# Patient Record
Sex: FEMALE | Race: WHITE | Employment: OTHER | URBAN - METROPOLITAN AREA
[De-identification: names, ages, dates, MRNs, and addresses within clinical notes are randomized per-mention and may not be internally consistent; named-entity substitution may affect disease eponyms.]

---

## 2020-01-17 PROBLEM — M16.12 PRIMARY OSTEOARTHRITIS OF LEFT HIP: Status: ACTIVE | Noted: 2020-01-17

## 2020-03-16 ENCOUNTER — LAB ENCOUNTER (OUTPATIENT)
Dept: LAB | Facility: HOSPITAL | Age: 75
End: 2020-03-16
Attending: ORTHOPAEDIC SURGERY
Payer: MEDICARE

## 2020-03-16 DIAGNOSIS — M16.12 PRIMARY OSTEOARTHRITIS OF LEFT HIP: ICD-10-CM

## 2020-03-16 LAB
ANTIBODY SCREEN: NEGATIVE
RH BLOOD TYPE: NEGATIVE

## 2020-03-16 PROCEDURE — 86850 RBC ANTIBODY SCREEN: CPT

## 2020-03-16 PROCEDURE — 87081 CULTURE SCREEN ONLY: CPT

## 2020-03-16 PROCEDURE — 86900 BLOOD TYPING SEROLOGIC ABO: CPT

## 2020-03-16 PROCEDURE — 86901 BLOOD TYPING SEROLOGIC RH(D): CPT

## 2020-08-03 ENCOUNTER — LAB ENCOUNTER (OUTPATIENT)
Dept: LAB | Facility: HOSPITAL | Age: 75
DRG: 470 | End: 2020-08-03
Attending: ORTHOPAEDIC SURGERY
Payer: MEDICARE

## 2020-08-03 DIAGNOSIS — E78.00 PURE HYPERCHOLESTEROLEMIA: ICD-10-CM

## 2020-08-03 DIAGNOSIS — M16.12 PRIMARY OSTEOARTHRITIS OF LEFT HIP: ICD-10-CM

## 2020-08-03 LAB — SARS-COV-2 RNA RESP QL NAA+PROBE: NOT DETECTED

## 2020-08-05 ENCOUNTER — ANESTHESIA EVENT (OUTPATIENT)
Dept: SURGERY | Facility: HOSPITAL | Age: 75
DRG: 470 | End: 2020-08-05
Payer: MEDICARE

## 2020-08-06 ENCOUNTER — HOSPITAL ENCOUNTER (INPATIENT)
Facility: HOSPITAL | Age: 75
LOS: 1 days | Discharge: HOME HEALTH | DRG: 470 | End: 2020-08-07
Attending: ORTHOPAEDIC SURGERY | Admitting: ORTHOPAEDIC SURGERY
Payer: MEDICARE

## 2020-08-06 ENCOUNTER — ANESTHESIA (OUTPATIENT)
Dept: SURGERY | Facility: HOSPITAL | Age: 75
DRG: 470 | End: 2020-08-06
Payer: MEDICARE

## 2020-08-06 ENCOUNTER — APPOINTMENT (OUTPATIENT)
Dept: GENERAL RADIOLOGY | Facility: HOSPITAL | Age: 75
DRG: 470 | End: 2020-08-06
Attending: PHYSICIAN ASSISTANT
Payer: MEDICARE

## 2020-08-06 ENCOUNTER — APPOINTMENT (OUTPATIENT)
Dept: GENERAL RADIOLOGY | Facility: HOSPITAL | Age: 75
DRG: 470 | End: 2020-08-06
Attending: ORTHOPAEDIC SURGERY
Payer: MEDICARE

## 2020-08-06 DIAGNOSIS — M16.12 PRIMARY OSTEOARTHRITIS OF LEFT HIP: Primary | ICD-10-CM

## 2020-08-06 DIAGNOSIS — E78.00 PURE HYPERCHOLESTEROLEMIA: ICD-10-CM

## 2020-08-06 LAB
ANTIBODY SCREEN: NEGATIVE
DEPRECATED RDW RBC AUTO: 51.3 FL (ref 35.1–46.3)
ERYTHROCYTE [DISTWIDTH] IN BLOOD BY AUTOMATED COUNT: 13.9 % (ref 11–15)
HCT VFR BLD AUTO: 44.3 % (ref 35–48)
HGB BLD-MCNC: 14.4 G/DL (ref 12–16)
MCH RBC QN AUTO: 32.1 PG (ref 26–34)
MCHC RBC AUTO-ENTMCNC: 32.5 G/DL (ref 31–37)
MCV RBC AUTO: 98.9 FL (ref 80–100)
PLATELET # BLD AUTO: 339 10(3)UL (ref 150–450)
RBC # BLD AUTO: 4.48 X10(6)UL (ref 3.8–5.3)
RH BLOOD TYPE: NEGATIVE
WBC # BLD AUTO: 15.2 X10(3) UL (ref 4–11)

## 2020-08-06 PROCEDURE — 86850 RBC ANTIBODY SCREEN: CPT

## 2020-08-06 PROCEDURE — 76942 ECHO GUIDE FOR BIOPSY: CPT | Performed by: ANESTHESIOLOGY

## 2020-08-06 PROCEDURE — 0SRB04Z REPLACEMENT OF LEFT HIP JOINT WITH CERAMIC ON POLYETHYLENE SYNTHETIC SUBSTITUTE, OPEN APPROACH: ICD-10-PCS | Performed by: ORTHOPAEDIC SURGERY

## 2020-08-06 PROCEDURE — 97116 GAIT TRAINING THERAPY: CPT

## 2020-08-06 PROCEDURE — 86900 BLOOD TYPING SEROLOGIC ABO: CPT

## 2020-08-06 PROCEDURE — 88311 DECALCIFY TISSUE: CPT | Performed by: ORTHOPAEDIC SURGERY

## 2020-08-06 PROCEDURE — 86901 BLOOD TYPING SEROLOGIC RH(D): CPT

## 2020-08-06 PROCEDURE — 85027 COMPLETE CBC AUTOMATED: CPT | Performed by: HOSPITALIST

## 2020-08-06 PROCEDURE — 3E0T3BZ INTRODUCTION OF ANESTHETIC AGENT INTO PERIPHERAL NERVES AND PLEXI, PERCUTANEOUS APPROACH: ICD-10-PCS | Performed by: ANESTHESIOLOGY

## 2020-08-06 PROCEDURE — 73501 X-RAY EXAM HIP UNI 1 VIEW: CPT | Performed by: PHYSICIAN ASSISTANT

## 2020-08-06 PROCEDURE — 97161 PT EVAL LOW COMPLEX 20 MIN: CPT

## 2020-08-06 PROCEDURE — 88304 TISSUE EXAM BY PATHOLOGIST: CPT | Performed by: ORTHOPAEDIC SURGERY

## 2020-08-06 DEVICE — PINNACLE 100 ACETABULAR SHELL GRIPTION 100 50MM OD
Type: IMPLANTABLE DEVICE | Site: HIP | Status: FUNCTIONAL
Brand: PINNACLE GRIPTION

## 2020-08-06 DEVICE — CORAIL HIP SYSTEM CEMENTLESS FEMORAL STEM 12/14 AMT 125 DEGREES KLA SIZE 11 HA COATED HIGH OFFSET COLLAR
Type: IMPLANTABLE DEVICE | Site: HIP | Status: FUNCTIONAL
Brand: CORAIL

## 2020-08-06 DEVICE — BIOLOX DELTA CERAMIC FEMORAL HEAD 32MM DIA +1 12/14 TAPER
Type: IMPLANTABLE DEVICE | Site: HIP | Status: FUNCTIONAL
Brand: BIOLOX DELTA

## 2020-08-06 DEVICE — PINNACLE HIP SOLUTIONS ALTRX POLYETHYLENE ACETABULAR LINER +4 NEUTRAL 32MM ID 50MM OD
Type: IMPLANTABLE DEVICE | Site: HIP | Status: FUNCTIONAL
Brand: PINNACLE ALTRX

## 2020-08-06 DEVICE — APEX HOLE ELIMINATOR - PS
Type: IMPLANTABLE DEVICE | Site: HIP | Status: FUNCTIONAL
Brand: APEX

## 2020-08-06 RX ORDER — ONDANSETRON 2 MG/ML
4 INJECTION INTRAMUSCULAR; INTRAVENOUS EVERY 4 HOURS PRN
Status: DISCONTINUED | OUTPATIENT
Start: 2020-08-06 | End: 2020-08-07

## 2020-08-06 RX ORDER — TIZANIDINE 2 MG/1
2 TABLET ORAL 3 TIMES DAILY PRN
Status: DISCONTINUED | OUTPATIENT
Start: 2020-08-06 | End: 2020-08-07

## 2020-08-06 RX ORDER — HYDROMORPHONE HYDROCHLORIDE 1 MG/ML
0.4 INJECTION, SOLUTION INTRAMUSCULAR; INTRAVENOUS; SUBCUTANEOUS EVERY 2 HOUR PRN
Status: DISCONTINUED | OUTPATIENT
Start: 2020-08-06 | End: 2020-08-07

## 2020-08-06 RX ORDER — SENNOSIDES 8.6 MG
17.2 TABLET ORAL NIGHTLY
Status: DISCONTINUED | OUTPATIENT
Start: 2020-08-06 | End: 2020-08-07

## 2020-08-06 RX ORDER — SODIUM CHLORIDE, SODIUM LACTATE, POTASSIUM CHLORIDE, CALCIUM CHLORIDE 600; 310; 30; 20 MG/100ML; MG/100ML; MG/100ML; MG/100ML
INJECTION, SOLUTION INTRAVENOUS CONTINUOUS
Status: DISCONTINUED | OUTPATIENT
Start: 2020-08-06 | End: 2020-08-07

## 2020-08-06 RX ORDER — SODIUM PHOSPHATE, DIBASIC AND SODIUM PHOSPHATE, MONOBASIC 7; 19 G/133ML; G/133ML
1 ENEMA RECTAL ONCE AS NEEDED
Status: DISCONTINUED | OUTPATIENT
Start: 2020-08-06 | End: 2020-08-07

## 2020-08-06 RX ORDER — ATORVASTATIN CALCIUM 10 MG/1
10 TABLET, FILM COATED ORAL NIGHTLY
Status: DISCONTINUED | OUTPATIENT
Start: 2020-08-06 | End: 2020-08-07

## 2020-08-06 RX ORDER — ACETAMINOPHEN 500 MG
1000 TABLET ORAL ONCE
Status: DISCONTINUED | OUTPATIENT
Start: 2020-08-06 | End: 2020-08-06 | Stop reason: HOSPADM

## 2020-08-06 RX ORDER — PHENYLEPHRINE HCL 10 MG/ML
VIAL (ML) INJECTION AS NEEDED
Status: DISCONTINUED | OUTPATIENT
Start: 2020-08-06 | End: 2020-08-06 | Stop reason: SURG

## 2020-08-06 RX ORDER — HYDROMORPHONE HYDROCHLORIDE 1 MG/ML
0.8 INJECTION, SOLUTION INTRAMUSCULAR; INTRAVENOUS; SUBCUTANEOUS EVERY 2 HOUR PRN
Status: DISCONTINUED | OUTPATIENT
Start: 2020-08-06 | End: 2020-08-07

## 2020-08-06 RX ORDER — SODIUM CHLORIDE 9 MG/ML
INJECTION, SOLUTION INTRAVENOUS CONTINUOUS
Status: DISCONTINUED | OUTPATIENT
Start: 2020-08-06 | End: 2020-08-07

## 2020-08-06 RX ORDER — ACETAMINOPHEN 325 MG/1
TABLET ORAL
Status: COMPLETED
Start: 2020-08-06 | End: 2020-08-06

## 2020-08-06 RX ORDER — ACETAMINOPHEN 500 MG
500 TABLET ORAL ONCE
Status: ON HOLD | COMMUNITY
End: 2020-08-07

## 2020-08-06 RX ORDER — ONDANSETRON 2 MG/ML
INJECTION INTRAMUSCULAR; INTRAVENOUS AS NEEDED
Status: DISCONTINUED | OUTPATIENT
Start: 2020-08-06 | End: 2020-08-06 | Stop reason: SURG

## 2020-08-06 RX ORDER — KETAMINE HYDROCHLORIDE 50 MG/ML
INJECTION, SOLUTION, CONCENTRATE INTRAMUSCULAR; INTRAVENOUS AS NEEDED
Status: DISCONTINUED | OUTPATIENT
Start: 2020-08-06 | End: 2020-08-06 | Stop reason: SURG

## 2020-08-06 RX ORDER — PROCHLORPERAZINE EDISYLATE 5 MG/ML
10 INJECTION INTRAMUSCULAR; INTRAVENOUS EVERY 6 HOURS PRN
Status: DISCONTINUED | OUTPATIENT
Start: 2020-08-06 | End: 2020-08-07

## 2020-08-06 RX ORDER — DIPHENHYDRAMINE HYDROCHLORIDE 50 MG/ML
25 INJECTION INTRAMUSCULAR; INTRAVENOUS ONCE AS NEEDED
Status: ACTIVE | OUTPATIENT
Start: 2020-08-06 | End: 2020-08-06

## 2020-08-06 RX ORDER — DEXAMETHASONE SODIUM PHOSPHATE 4 MG/ML
VIAL (ML) INJECTION AS NEEDED
Status: DISCONTINUED | OUTPATIENT
Start: 2020-08-06 | End: 2020-08-06 | Stop reason: SURG

## 2020-08-06 RX ORDER — CEFAZOLIN SODIUM/WATER 2 G/20 ML
2 SYRINGE (ML) INTRAVENOUS ONCE
Status: COMPLETED | OUTPATIENT
Start: 2020-08-06 | End: 2020-08-06

## 2020-08-06 RX ORDER — HYDROMORPHONE HYDROCHLORIDE 1 MG/ML
0.2 INJECTION, SOLUTION INTRAMUSCULAR; INTRAVENOUS; SUBCUTANEOUS EVERY 2 HOUR PRN
Status: DISCONTINUED | OUTPATIENT
Start: 2020-08-06 | End: 2020-08-07

## 2020-08-06 RX ORDER — ACETAMINOPHEN 325 MG/1
650 TABLET ORAL ONCE
Status: COMPLETED | OUTPATIENT
Start: 2020-08-06 | End: 2020-08-06

## 2020-08-06 RX ORDER — BISACODYL 10 MG
10 SUPPOSITORY, RECTAL RECTAL
Status: DISCONTINUED | OUTPATIENT
Start: 2020-08-06 | End: 2020-08-07

## 2020-08-06 RX ORDER — ACETAMINOPHEN 500 MG
1000 TABLET ORAL 4 TIMES DAILY
Status: DISCONTINUED | OUTPATIENT
Start: 2020-08-06 | End: 2020-08-07

## 2020-08-06 RX ORDER — METOCLOPRAMIDE HYDROCHLORIDE 5 MG/ML
10 INJECTION INTRAMUSCULAR; INTRAVENOUS EVERY 6 HOURS PRN
Status: DISCONTINUED | OUTPATIENT
Start: 2020-08-06 | End: 2020-08-07

## 2020-08-06 RX ORDER — OXYCODONE HYDROCHLORIDE 10 MG/1
10 TABLET ORAL EVERY 4 HOURS PRN
Status: DISCONTINUED | OUTPATIENT
Start: 2020-08-06 | End: 2020-08-07

## 2020-08-06 RX ORDER — OXYCODONE HYDROCHLORIDE 15 MG/1
15 TABLET ORAL EVERY 4 HOURS PRN
Status: DISCONTINUED | OUTPATIENT
Start: 2020-08-06 | End: 2020-08-07

## 2020-08-06 RX ORDER — DOCUSATE SODIUM 100 MG/1
100 CAPSULE, LIQUID FILLED ORAL 2 TIMES DAILY
Status: DISCONTINUED | OUTPATIENT
Start: 2020-08-06 | End: 2020-08-07

## 2020-08-06 RX ORDER — MAGNESIUM HYDROXIDE 1200 MG/15ML
LIQUID ORAL CONTINUOUS PRN
Status: COMPLETED | OUTPATIENT
Start: 2020-08-06 | End: 2020-08-06

## 2020-08-06 RX ORDER — DIPHENHYDRAMINE HYDROCHLORIDE 50 MG/ML
12.5 INJECTION INTRAMUSCULAR; INTRAVENOUS EVERY 4 HOURS PRN
Status: DISCONTINUED | OUTPATIENT
Start: 2020-08-06 | End: 2020-08-07

## 2020-08-06 RX ORDER — CEFAZOLIN SODIUM/WATER 2 G/20 ML
2 SYRINGE (ML) INTRAVENOUS EVERY 8 HOURS
Status: COMPLETED | OUTPATIENT
Start: 2020-08-06 | End: 2020-08-07

## 2020-08-06 RX ORDER — MELATONIN
325
Status: DISCONTINUED | OUTPATIENT
Start: 2020-08-07 | End: 2020-08-07

## 2020-08-06 RX ORDER — POLYETHYLENE GLYCOL 3350 17 G/17G
17 POWDER, FOR SOLUTION ORAL DAILY PRN
Status: DISCONTINUED | OUTPATIENT
Start: 2020-08-06 | End: 2020-08-07

## 2020-08-06 RX ORDER — ZOLPIDEM TARTRATE 5 MG/1
5 TABLET ORAL NIGHTLY PRN
Status: DISCONTINUED | OUTPATIENT
Start: 2020-08-06 | End: 2020-08-07

## 2020-08-06 RX ORDER — DEXAMETHASONE SODIUM PHOSPHATE 10 MG/ML
8 INJECTION, SOLUTION INTRAMUSCULAR; INTRAVENOUS ONCE
Status: COMPLETED | OUTPATIENT
Start: 2020-08-07 | End: 2020-08-07

## 2020-08-06 RX ORDER — DIPHENHYDRAMINE HCL 25 MG
25 CAPSULE ORAL EVERY 4 HOURS PRN
Status: DISCONTINUED | OUTPATIENT
Start: 2020-08-06 | End: 2020-08-07

## 2020-08-06 RX ORDER — OXYCODONE HYDROCHLORIDE 5 MG/1
5 TABLET ORAL EVERY 4 HOURS PRN
Status: DISCONTINUED | OUTPATIENT
Start: 2020-08-06 | End: 2020-08-07

## 2020-08-06 RX ORDER — BUPIVACAINE HYDROCHLORIDE 7.5 MG/ML
INJECTION, SOLUTION INTRASPINAL AS NEEDED
Status: DISCONTINUED | OUTPATIENT
Start: 2020-08-06 | End: 2020-08-06 | Stop reason: SURG

## 2020-08-06 RX ORDER — MIDAZOLAM HYDROCHLORIDE 1 MG/ML
INJECTION INTRAMUSCULAR; INTRAVENOUS AS NEEDED
Status: DISCONTINUED | OUTPATIENT
Start: 2020-08-06 | End: 2020-08-06 | Stop reason: SURG

## 2020-08-06 RX ADMIN — PHENYLEPHRINE HCL 100 MCG: 10 MG/ML VIAL (ML) INJECTION at 09:52:00

## 2020-08-06 RX ADMIN — BUPIVACAINE HYDROCHLORIDE 1.6 ML: 7.5 INJECTION, SOLUTION INTRASPINAL at 09:10:00

## 2020-08-06 RX ADMIN — SODIUM CHLORIDE, SODIUM LACTATE, POTASSIUM CHLORIDE, CALCIUM CHLORIDE: 600; 310; 30; 20 INJECTION, SOLUTION INTRAVENOUS at 10:45:00

## 2020-08-06 RX ADMIN — CEFAZOLIN SODIUM/WATER 2 G: 2 G/20 ML SYRINGE (ML) INTRAVENOUS at 09:05:00

## 2020-08-06 RX ADMIN — KETAMINE HYDROCHLORIDE 10 MG: 50 INJECTION, SOLUTION, CONCENTRATE INTRAMUSCULAR; INTRAVENOUS at 08:49:00

## 2020-08-06 RX ADMIN — MIDAZOLAM HYDROCHLORIDE 2 MG: 1 INJECTION INTRAMUSCULAR; INTRAVENOUS at 08:48:00

## 2020-08-06 RX ADMIN — PHENYLEPHRINE HCL 100 MCG: 10 MG/ML VIAL (ML) INJECTION at 09:42:00

## 2020-08-06 RX ADMIN — DEXAMETHASONE SODIUM PHOSPHATE 8 MG: 4 MG/ML VIAL (ML) INJECTION at 10:28:00

## 2020-08-06 RX ADMIN — MIDAZOLAM HYDROCHLORIDE 1 MG: 1 INJECTION INTRAMUSCULAR; INTRAVENOUS at 08:53:00

## 2020-08-06 RX ADMIN — MIDAZOLAM HYDROCHLORIDE 1 MG: 1 INJECTION INTRAMUSCULAR; INTRAVENOUS at 09:02:00

## 2020-08-06 RX ADMIN — SODIUM CHLORIDE, SODIUM LACTATE, POTASSIUM CHLORIDE, CALCIUM CHLORIDE: 600; 310; 30; 20 INJECTION, SOLUTION INTRAVENOUS at 10:21:00

## 2020-08-06 RX ADMIN — ONDANSETRON 4 MG: 2 INJECTION INTRAMUSCULAR; INTRAVENOUS at 10:28:00

## 2020-08-06 NOTE — ANESTHESIA PREPROCEDURE EVALUATION
PRE-OP EVALUATION    Patient Name: Bee Mcleod    Pre-op Diagnosis: Primary osteoarthritis of left hip [M16.12]    Procedure(s):  LEFT TOTAL HIP ARTHROPLASTY    Surgeon(s) and Role:     Rod German MD - Primary    Pre-op vitals reviewed.         Bod comment: few cigareets when out with friends    Alcohol use:  Yes      Alcohol/week: 2.5 - 4.2 standard drinks      Types: 3 - 5 Standard drinks or equivalent per week      Frequency: 2-4 times a month      Drinks per session: 1 or 2      Binge frequency: N

## 2020-08-06 NOTE — BRIEF OP NOTE
Pre-Operative Diagnosis: Primary osteoarthritis of left hip [M16.12]     Post-Operative Diagnosis: Primary osteoarthritis of left hip [M16.12]      Procedure Performed:   Procedure(s):  LEFT TOTAL HIP ARTHROPLASTY    Surgeon(s) and Role:     Maria Fernanda Precise

## 2020-08-06 NOTE — ANESTHESIA PROCEDURE NOTES
Regional Block  Performed by: Dawn Scott MD  Authorized by: Dawn Scott MD       General Information and Staff    Start Time:  8/6/2020 9:10 AM  End Time:  8/6/2020 9:15 AM  Anesthesiologist:  Dawn Scott MD  Performed by:   Anesthesiolog

## 2020-08-06 NOTE — H&P
Trinh Galeas   7/22/2020 9:15 AM   Office Visit   MRN:  [id number]   Description: 70year old female Provider: Sally German MD Department: [address] [place] [address]   Scanning Cover Sheet     Click to print Barcode Encounter Cover Sheet for scanning   O • Meloxicam 15 MG Oral Tab Take 1 tablet (15 mg total) by mouth daily. 30 tablet 1   • hydrochlorothiazide 12.5 MG Oral Tab Take 1 tablet (12.5 mg total) by mouth daily.  90 tablet 3   • atorvastatin 10 MG Oral Tab Take 1 tablet (10 mg total) by mouth daily /72   Pulse 79   Temp 97.1 °F (36.2 °C) (Temporal)   Ht 5' 6\" (1.676 m)   Wt 162 lb 6.4 oz (73.7 kg)   LMP  (LMP Unknown)   SpO2 98%   Breastfeeding No   BMI 26.21 kg/m²   GENERAL: well developed, well nourished,in no apparent distress  SKIN: no celia

## 2020-08-06 NOTE — CONSULTS
General Medicine Consult      Reason for consult: medical management    Consulted by: Dr. Soha Koenig    PCP: Keysha Bloom MD      History of Present Illness: Patient is a 70year old female with HL, OA is consulted for medical management s/p L NATI. sodium chloride       PRN Medication:sodium chloride 0.9%, zolpidem, PEG 3350, magnesium hydroxide, bisacodyl, Fleet Enema, ondansetron HCl, Metoclopramide HCl, Prochlorperazine Edisylate, diphenhydrAMINE HCl, diphenhydrAMINE **OR** diphenhydrAMINE HCl, ti sounds normal. Non distended, no overt hernias   Extremities: Extremities  no cyanosis or edema.    Skin: Skin color, texture, turgor normal. No visible rashes    Neurologic:  Psychiatric: No facial droop or dysarthria   appropriate affect,  memory intact

## 2020-08-06 NOTE — PLAN OF CARE
.RECD FROM [facility] PER BED ,ALERT ,AWAKE, ORIENTED. SEE ASSESSMENT NOTES. CALL LIGHT W/IN REACH INSTRUCTED TO CALL FOR ALL FOR ALL NEEDS AND ASSISTANCE. PT AND SPOUSE AWARE OF PLAN OF CARE DR CHING [facility]  NOT AVAIL WILL LET CALL [facility] FOR CONSULT.

## 2020-08-06 NOTE — PHYSICAL THERAPY NOTE
PHYSICAL THERAPY HIP EVALUATION - INPATIENT     Room Number: 368/368-A  Evaluation Date: 8/6/2020  Type of Evaluation: Initial  Physician Order: PT Eval and Treat    Presenting Problem: s/p left NATI 8/6/20  Reason for Therapy: Mobility Dysfunction and Disc WFL - within functional limits    RANGE OF MOTION AND STRENGTH ASSESSMENT  Upper extremity ROM and strength are within functional limits     Lower extremity ROM is within functional limits except left hip due to surgery    Lower extremity strength is withi with min a for left LE only, good sitting balance, no dizziness, sitting bp 128/71, able to perform left LAQ. Pt provided verbal instruction and demonstration of rw use, stood to rw with min a.   Pt able to perform left tke, marching in place with no left mobility; Endurance; Energy conservation;Patient education;Gait training;Range of motion;Strengthening;Stoop training;Transfer training  Rehab Potential : Good  Frequency (Obs): Daily  Number of Visits to Meet Established Goals: 3      CURRENT GOALS  Goal #1

## 2020-08-06 NOTE — INTERVAL H&P NOTE
Pre-op Diagnosis: Primary osteoarthritis of left hip [M16.12]    The above referenced H&P was reviewed by Janis Frost MD on 8/6/2020, the patient was examined and no significant changes have occurred in the patient's condition since the H&P was perfor

## 2020-08-06 NOTE — OPERATIVE REPORT
PATIENT'S NAME: Sophia Chang   ATTENDING PHYSICIAN: Shirley Wang MD   OPERATING PHYSICIAN: Shirley Wang MD   PATIENT ACCOUNT#:   [de-identified]    LOCATION:  [address] [place] [address],[address] RECORD #:   [id number]    YOB: 1945  ADMISSION D down in line with the vastus lateralis. The tendon was subperiosteally elevated off the anterior aspect of the greater trochanter down to  capsule, which was incised in a T-type fashion, partially excising the capsule to gain access to the joint.   After a Morfin taper, the final head was impacted in place with good fixation. The final reduction was performed and the hip was put through good range of motion with good leg length and offset and no instability. The pin was removed from the pelvis.   Irrisept ir

## 2020-08-06 NOTE — ANESTHESIA POSTPROCEDURE EVALUATION
[address] Patient Status:  Inpatient   Age/Gender 70year old female MRN [id number]   [facility] SURGERY Attending Drew Pruitt MD   Hosp Day # 0 PCP Thom Frankel, MD       Anesthesia Post-op Note    Procedure(

## 2020-08-06 NOTE — ANESTHESIA PROCEDURE NOTES
Spinal Block  Performed by: Parul Everett MD  Authorized by: Parul Everett MD       General Information and Staff    Start Time:  8/6/2020 8:52 AM  End Time:  8/6/2020 9:10 AM  Anesthesiologist:  Parul Everett MD  Performed by:   Anesthesiologis

## 2020-08-07 VITALS
BODY MASS INDEX: 25.72 KG/M2 | DIASTOLIC BLOOD PRESSURE: 76 MMHG | HEIGHT: 66 IN | SYSTOLIC BLOOD PRESSURE: 127 MMHG | WEIGHT: 160.06 LBS | RESPIRATION RATE: 20 BRPM | TEMPERATURE: 98 F | HEART RATE: 85 BPM | OXYGEN SATURATION: 94 %

## 2020-08-07 PROBLEM — Z47.89 ORTHOPEDIC AFTERCARE: Status: ACTIVE | Noted: 2020-08-07

## 2020-08-07 PROCEDURE — 97530 THERAPEUTIC ACTIVITIES: CPT

## 2020-08-07 PROCEDURE — 97110 THERAPEUTIC EXERCISES: CPT

## 2020-08-07 PROCEDURE — 97116 GAIT TRAINING THERAPY: CPT

## 2020-08-07 PROCEDURE — 97165 OT EVAL LOW COMPLEX 30 MIN: CPT

## 2020-08-07 PROCEDURE — 97535 SELF CARE MNGMENT TRAINING: CPT

## 2020-08-07 RX ORDER — ACETAMINOPHEN AND CODEINE PHOSPHATE 300; 30 MG/1; MG/1
1 TABLET ORAL EVERY 4 HOURS PRN
Status: DISCONTINUED | OUTPATIENT
Start: 2020-08-07 | End: 2020-08-07

## 2020-08-07 RX ORDER — TIZANIDINE 2 MG/1
1 TABLET ORAL 3 TIMES DAILY PRN
Status: DISCONTINUED | OUTPATIENT
Start: 2020-08-07 | End: 2020-08-07

## 2020-08-07 RX ORDER — ACETAMINOPHEN 325 MG/1
325 TABLET ORAL EVERY 4 HOURS PRN
Status: DISCONTINUED | OUTPATIENT
Start: 2020-08-07 | End: 2020-08-07

## 2020-08-07 RX ORDER — ACETAMINOPHEN 325 MG/1
650 TABLET ORAL EVERY 4 HOURS PRN
Status: DISCONTINUED | OUTPATIENT
Start: 2020-08-07 | End: 2020-08-07

## 2020-08-07 RX ORDER — ACETAMINOPHEN AND CODEINE PHOSPHATE 300; 30 MG/1; MG/1
2 TABLET ORAL EVERY 4 HOURS PRN
Status: DISCONTINUED | OUTPATIENT
Start: 2020-08-07 | End: 2020-08-07

## 2020-08-07 NOTE — CM/SW NOTE
71 yo sp total hip replacement. Post op protocol orders for discharge planning. Preop Joint journey plan from [address] for pt to discharge with [facility].     HOME SITUATION  Type of Home: House   Home Layout: One level(master on main)  S

## 2020-08-07 NOTE — PLAN OF CARE
Julia Coley for d/c per Dr. Alvin Sinha, Home w/ [facility] today.

## 2020-08-07 NOTE — CM/SW NOTE
08/07/20 1000   Discharge disposition   Expected discharge disposition Home-Health   Name of Facillity/Home Care/Hospice Residential   Home services after discharge Skilled home care   Discharge transportation Private car

## 2020-08-07 NOTE — OCCUPATIONAL THERAPY NOTE
OCCUPATIONAL THERAPY QUICK EVALUATION - INPATIENT    Room Number: 368/368-A  Evaluation Date: 8/7/2020     Type of Evaluation: Quick Eval  Presenting Problem: (L NATI)    Physician Order: IP Consult to Occupational Therapy  Reason for Therapy:  ADL/IADL Dys Tolerated    PAIN ASSESSMENT  Ratin  Location: (L hip)  Management Techniques: Body mechanics; Relaxation;Repositioning    COGNITION  WFL    RANGE OF MOTION AND STRENGTH ASSESSMENT  Upper extremity ROM is within functional limits     Upper extremity str completed with supervision on elevated toilet. Standing oral care, hair care completed in standing with mod I.     Instruction was given on bed mobility techniques, patient able to demonstrate good integration of hip precautions however she was limited by Assessment/Performance Deficits  MODERATE - Comorbidities and min to mod modifications of tasks    Clinical Decision Making  LOW - Analysis of occupational profile, problem-focused assessments, limited treatment options    Overall Complexity  LOW     OT Monika Cruz

## 2020-08-07 NOTE — PLAN OF CARE
WBC's=15.2, repeat CBC in am, Tylenol #3 ordered for pain, Oxycontin for severe pain , watched discharge videos, tubi- in place, scripts filled prior to admission, HH on d/c , spouse at bedside, updated on plan of care.

## 2020-08-07 NOTE — PROGRESS NOTES
Post Op Day 1 Ortho Note: Left NATI    Status Post Nerve Block:  Type of Nerve Block: Left PENG  Single Injection Nerve Block    Post op review: No evidence of immediate block related complications, No paresthesia noted, Able to lift leg(s), Able to plantar

## 2020-08-07 NOTE — PHYSICAL THERAPY NOTE
PHYSICAL THERAPY HIP TREATMENT NOTE - INPATIENT      Room Number: 368/368-A     Session: 1  Number of Visits to Meet Established Goals: 3    Presenting Problem: s/p left NATI 8/6/20    Problem List  Active Problems:    * No active hospital problems.  * Little   How much help from another person does the patient currently need. ..   -   Moving to and from a bed to a chair (including a wheelchair)?: A Little   -   Need to walk in hospital room?: A Little   -   Climbing 3-5 steps with a railing?: A Little chair;Needs met;Call light within reach;RN aware of session/findings; All patient questions and concerns addressed;SCDs in place; Ice applied; Family present    ASSESSMENT   Pt continues to present with impaired strength and decreased ROM LLE , decreased endu

## 2020-08-07 NOTE — PLAN OF CARE
Has adequate pain relief on prn oral med. Pt moving well, min assist when up. VSS, placed on o2 at 2l/nc, O2 sat drops to 87% when asleep. Aquacel dsg to lt hip cdi. Pt uses IS and ankle exercise as directed. Plan is d/c home with [facility].

## 2020-08-07 NOTE — PROGRESS NOTES
[facility] Hospitalist Progress Note     PCP: Carlos Floyd MD    CC:  Follow up    SUBJECTIVE:   Pt sitting up in chair, pain manageable.  No cp/sob/n/v/f/c. +U, no BM    OBJECTIVE:  Temp:  [97.6 °F (36.4 °C)-98.6 °F (37 °C)] 98.6 °F (37 °C)  Pulse:  [75-9 problems.  *    72 year old female with HL, OA is consulted for medical management s/p L NATI.     **Expected pain  -IV dilaudid prn, oxy prn-encourage transition to oral  -antiemetics, bowel regimen    **leukocytosis-reactive, monitor     **OA s/p L NATI  -m

## 2021-07-28 PROBLEM — Z96.642 H/O TOTAL HIP ARTHROPLASTY, LEFT: Status: ACTIVE | Noted: 2021-07-28

## 2022-05-24 RX ORDER — SCOLOPAMINE TRANSDERMAL SYSTEM 1 MG/1
1 PATCH, EXTENDED RELEASE TRANSDERMAL ONCE
Status: CANCELLED | OUTPATIENT
Start: 2022-05-24 | End: 2022-05-24

## 2022-05-24 RX ORDER — MELOXICAM 15 MG/1
15 TABLET ORAL DAILY
COMMUNITY
End: 2022-07-01

## 2022-05-25 ENCOUNTER — ORDER TRANSCRIPTION (OUTPATIENT)
Dept: PHYSICAL THERAPY | Facility: HOSPITAL | Age: 77
End: 2022-05-25

## 2022-05-25 DIAGNOSIS — Z96.641 HISTORY OF RIGHT HIP REPLACEMENT: Primary | ICD-10-CM

## 2022-06-27 ENCOUNTER — LAB ENCOUNTER (OUTPATIENT)
Dept: LAB | Age: 77
End: 2022-06-27
Attending: ORTHOPAEDIC SURGERY
Payer: MEDICARE

## 2022-06-27 ENCOUNTER — LABORATORY ENCOUNTER (OUTPATIENT)
Dept: LAB | Age: 77
End: 2022-06-27
Attending: ORTHOPAEDIC SURGERY
Payer: MEDICARE

## 2022-06-27 DIAGNOSIS — M16.11 OSTEOARTHRITIS OF RIGHT HIP: ICD-10-CM

## 2022-06-27 LAB
ANTIBODY SCREEN: NEGATIVE
BASOPHILS # BLD AUTO: 0.06 X10(3) UL (ref 0–0.2)
BASOPHILS NFR BLD AUTO: 0.6 %
EOSINOPHIL # BLD AUTO: 0.12 X10(3) UL (ref 0–0.7)
EOSINOPHIL NFR BLD AUTO: 1.2 %
ERYTHROCYTE [DISTWIDTH] IN BLOOD BY AUTOMATED COUNT: 13.6 %
HCT VFR BLD AUTO: 48.2 %
HGB BLD-MCNC: 15.3 G/DL
IMM GRANULOCYTES # BLD AUTO: 0.03 X10(3) UL (ref 0–1)
IMM GRANULOCYTES NFR BLD: 0.3 %
LYMPHOCYTES # BLD AUTO: 3.37 X10(3) UL (ref 1–4)
LYMPHOCYTES NFR BLD AUTO: 34.9 %
MCH RBC QN AUTO: 32.2 PG (ref 26–34)
MCHC RBC AUTO-ENTMCNC: 31.7 G/DL (ref 31–37)
MCV RBC AUTO: 101.5 FL
MONOCYTES # BLD AUTO: 0.69 X10(3) UL (ref 0.1–1)
MONOCYTES NFR BLD AUTO: 7.1 %
NEUTROPHILS # BLD AUTO: 5.39 X10 (3) UL (ref 1.5–7.7)
NEUTROPHILS # BLD AUTO: 5.39 X10(3) UL (ref 1.5–7.7)
NEUTROPHILS NFR BLD AUTO: 55.9 %
PLATELET # BLD AUTO: 372 10(3)UL (ref 150–450)
RBC # BLD AUTO: 4.75 X10(6)UL
RH BLOOD TYPE: NEGATIVE
SARS-COV-2 RNA RESP QL NAA+PROBE: NOT DETECTED
WBC # BLD AUTO: 9.7 X10(3) UL (ref 4–11)

## 2022-06-27 PROCEDURE — 86900 BLOOD TYPING SEROLOGIC ABO: CPT

## 2022-06-27 PROCEDURE — 86901 BLOOD TYPING SEROLOGIC RH(D): CPT

## 2022-06-27 PROCEDURE — 36415 COLL VENOUS BLD VENIPUNCTURE: CPT

## 2022-06-27 PROCEDURE — 85025 COMPLETE CBC W/AUTO DIFF WBC: CPT

## 2022-06-27 PROCEDURE — 86850 RBC ANTIBODY SCREEN: CPT

## 2022-06-30 ENCOUNTER — ANESTHESIA EVENT (OUTPATIENT)
Dept: SURGERY | Facility: HOSPITAL | Age: 77
End: 2022-06-30
Payer: MEDICARE

## 2022-06-30 ENCOUNTER — APPOINTMENT (OUTPATIENT)
Dept: GENERAL RADIOLOGY | Facility: HOSPITAL | Age: 77
End: 2022-06-30
Attending: PHYSICIAN ASSISTANT
Payer: MEDICARE

## 2022-06-30 ENCOUNTER — HOSPITAL ENCOUNTER (OUTPATIENT)
Facility: HOSPITAL | Age: 77
Discharge: HOME HEALTH | End: 2022-07-01
Attending: ORTHOPAEDIC SURGERY | Admitting: ORTHOPAEDIC SURGERY
Payer: MEDICARE

## 2022-06-30 ENCOUNTER — ANESTHESIA (OUTPATIENT)
Dept: SURGERY | Facility: HOSPITAL | Age: 77
End: 2022-06-30
Payer: MEDICARE

## 2022-06-30 DIAGNOSIS — M16.11 OSTEOARTHRITIS OF RIGHT HIP: Primary | ICD-10-CM

## 2022-06-30 LAB — CREAT BLD-MCNC: 0.67 MG/DL

## 2022-06-30 PROCEDURE — 88304 TISSUE EXAM BY PATHOLOGIST: CPT | Performed by: ORTHOPAEDIC SURGERY

## 2022-06-30 PROCEDURE — 0SR902A REPLACEMENT OF RIGHT HIP JOINT WITH METAL ON POLYETHYLENE SYNTHETIC SUBSTITUTE, UNCEMENTED, OPEN APPROACH: ICD-10-PCS | Performed by: ORTHOPAEDIC SURGERY

## 2022-06-30 PROCEDURE — 97116 GAIT TRAINING THERAPY: CPT

## 2022-06-30 PROCEDURE — 73501 X-RAY EXAM HIP UNI 1 VIEW: CPT | Performed by: PHYSICIAN ASSISTANT

## 2022-06-30 PROCEDURE — 97110 THERAPEUTIC EXERCISES: CPT

## 2022-06-30 PROCEDURE — 88311 DECALCIFY TISSUE: CPT | Performed by: ORTHOPAEDIC SURGERY

## 2022-06-30 PROCEDURE — 82565 ASSAY OF CREATININE: CPT | Performed by: PHYSICIAN ASSISTANT

## 2022-06-30 PROCEDURE — 97161 PT EVAL LOW COMPLEX 20 MIN: CPT

## 2022-06-30 DEVICE — PINNACLE HIP SOLUTIONS ALTRX POLYETHYLENE ACETABULAR LINER +4 NEUTRAL 32MM ID 50MM OD
Type: IMPLANTABLE DEVICE | Site: HIP | Status: FUNCTIONAL
Brand: PINNACLE ALTRX

## 2022-06-30 DEVICE — PINNACLE 100 ACETABULAR SHELL GRIPTION 100 50MM OD
Type: IMPLANTABLE DEVICE | Site: HIP | Status: FUNCTIONAL
Brand: PINNACLE GRIPTION

## 2022-06-30 DEVICE — CORAIL HIP SYSTEM CEMENTLESS FEMORAL STEM 12/14 AMT 135 DEGREES KA SIZE 11 HA COATED STANDARD COLLAR
Type: IMPLANTABLE DEVICE | Site: HIP | Status: FUNCTIONAL
Brand: CORAIL

## 2022-06-30 DEVICE — BIOLOX DELTA CERAMIC FEMORAL HEAD 32MM DIA +1 12/14 TAPER
Type: IMPLANTABLE DEVICE | Site: HIP | Status: FUNCTIONAL
Brand: BIOLOX DELTA

## 2022-06-30 DEVICE — APEX HOLE ELIMINATOR - PS
Type: IMPLANTABLE DEVICE | Site: HIP | Status: FUNCTIONAL
Brand: APEX

## 2022-06-30 RX ORDER — POLYETHYLENE GLYCOL 3350 17 G/17G
17 POWDER, FOR SOLUTION ORAL DAILY PRN
Status: DISCONTINUED | OUTPATIENT
Start: 2022-06-30 | End: 2022-07-01

## 2022-06-30 RX ORDER — CEFAZOLIN SODIUM/WATER 2 G/20 ML
2 SYRINGE (ML) INTRAVENOUS EVERY 8 HOURS
Status: DISCONTINUED | OUTPATIENT
Start: 2022-06-30 | End: 2022-06-30 | Stop reason: SDUPTHER

## 2022-06-30 RX ORDER — ACETAMINOPHEN 325 MG/1
650 TABLET ORAL ONCE
Status: COMPLETED | OUTPATIENT
Start: 2022-06-30 | End: 2022-06-30

## 2022-06-30 RX ORDER — BUPIVACAINE HYDROCHLORIDE 7.5 MG/ML
INJECTION, SOLUTION INTRASPINAL AS NEEDED
Status: DISCONTINUED | OUTPATIENT
Start: 2022-06-30 | End: 2022-06-30 | Stop reason: SURG

## 2022-06-30 RX ORDER — ASPIRIN 325 MG
325 TABLET ORAL 2 TIMES DAILY
COMMUNITY
Start: 2022-06-22

## 2022-06-30 RX ORDER — ASPIRIN 325 MG
325 TABLET ORAL 2 TIMES DAILY
Status: DISCONTINUED | OUTPATIENT
Start: 2022-06-30 | End: 2022-07-01

## 2022-06-30 RX ORDER — DIPHENHYDRAMINE HCL 25 MG
25 CAPSULE ORAL EVERY 4 HOURS PRN
Status: DISCONTINUED | OUTPATIENT
Start: 2022-06-30 | End: 2022-07-01

## 2022-06-30 RX ORDER — HYDROMORPHONE HYDROCHLORIDE 1 MG/ML
0.2 INJECTION, SOLUTION INTRAMUSCULAR; INTRAVENOUS; SUBCUTANEOUS EVERY 2 HOUR PRN
Status: DISCONTINUED | OUTPATIENT
Start: 2022-06-30 | End: 2022-07-01

## 2022-06-30 RX ORDER — KETAMINE HYDROCHLORIDE 50 MG/ML
INJECTION, SOLUTION, CONCENTRATE INTRAMUSCULAR; INTRAVENOUS AS NEEDED
Status: DISCONTINUED | OUTPATIENT
Start: 2022-06-30 | End: 2022-06-30 | Stop reason: SURG

## 2022-06-30 RX ORDER — HYDROMORPHONE HYDROCHLORIDE 1 MG/ML
0.4 INJECTION, SOLUTION INTRAMUSCULAR; INTRAVENOUS; SUBCUTANEOUS EVERY 5 MIN PRN
Status: DISCONTINUED | OUTPATIENT
Start: 2022-06-30 | End: 2022-06-30 | Stop reason: HOSPADM

## 2022-06-30 RX ORDER — DIPHENHYDRAMINE HYDROCHLORIDE 50 MG/ML
25 INJECTION INTRAMUSCULAR; INTRAVENOUS ONCE AS NEEDED
Status: ACTIVE | OUTPATIENT
Start: 2022-06-30 | End: 2022-06-30

## 2022-06-30 RX ORDER — ONDANSETRON 2 MG/ML
INJECTION INTRAMUSCULAR; INTRAVENOUS AS NEEDED
Status: DISCONTINUED | OUTPATIENT
Start: 2022-06-30 | End: 2022-06-30 | Stop reason: SURG

## 2022-06-30 RX ORDER — ONDANSETRON 2 MG/ML
4 INJECTION INTRAMUSCULAR; INTRAVENOUS EVERY 6 HOURS PRN
Status: DISCONTINUED | OUTPATIENT
Start: 2022-06-30 | End: 2022-06-30 | Stop reason: HOSPADM

## 2022-06-30 RX ORDER — MIDAZOLAM HYDROCHLORIDE 1 MG/ML
INJECTION INTRAMUSCULAR; INTRAVENOUS AS NEEDED
Status: DISCONTINUED | OUTPATIENT
Start: 2022-06-30 | End: 2022-06-30 | Stop reason: SURG

## 2022-06-30 RX ORDER — DIPHENHYDRAMINE HYDROCHLORIDE 50 MG/ML
12.5 INJECTION INTRAMUSCULAR; INTRAVENOUS EVERY 4 HOURS PRN
Status: DISCONTINUED | OUTPATIENT
Start: 2022-06-30 | End: 2022-07-01

## 2022-06-30 RX ORDER — CELECOXIB 200 MG/1
200 CAPSULE ORAL DAILY
COMMUNITY
Start: 2022-06-22 | End: 2022-07-22

## 2022-06-30 RX ORDER — MELATONIN
325
Status: DISCONTINUED | OUTPATIENT
Start: 2022-06-30 | End: 2022-07-01

## 2022-06-30 RX ORDER — HYDROMORPHONE HYDROCHLORIDE 1 MG/ML
0.2 INJECTION, SOLUTION INTRAMUSCULAR; INTRAVENOUS; SUBCUTANEOUS EVERY 5 MIN PRN
Status: DISCONTINUED | OUTPATIENT
Start: 2022-06-30 | End: 2022-06-30 | Stop reason: HOSPADM

## 2022-06-30 RX ORDER — DEXAMETHASONE SODIUM PHOSPHATE 10 MG/ML
8 INJECTION, SOLUTION INTRAMUSCULAR; INTRAVENOUS ONCE
Status: COMPLETED | OUTPATIENT
Start: 2022-07-01 | End: 2022-07-01

## 2022-06-30 RX ORDER — CEFAZOLIN SODIUM/WATER 2 G/20 ML
2 SYRINGE (ML) INTRAVENOUS EVERY 8 HOURS
Status: COMPLETED | OUTPATIENT
Start: 2022-06-30 | End: 2022-06-30

## 2022-06-30 RX ORDER — NALOXONE HYDROCHLORIDE 0.4 MG/ML
80 INJECTION, SOLUTION INTRAMUSCULAR; INTRAVENOUS; SUBCUTANEOUS AS NEEDED
Status: DISCONTINUED | OUTPATIENT
Start: 2022-06-30 | End: 2022-06-30 | Stop reason: HOSPADM

## 2022-06-30 RX ORDER — TRANEXAMIC ACID 10 MG/ML
1000 INJECTION, SOLUTION INTRAVENOUS ONCE
Status: COMPLETED | OUTPATIENT
Start: 2022-06-30 | End: 2022-06-30

## 2022-06-30 RX ORDER — CEFAZOLIN SODIUM/WATER 2 G/20 ML
2 SYRINGE (ML) INTRAVENOUS ONCE
Status: COMPLETED | OUTPATIENT
Start: 2022-06-30 | End: 2022-06-30

## 2022-06-30 RX ORDER — ATORVASTATIN CALCIUM 10 MG/1
10 TABLET, FILM COATED ORAL DAILY
Status: DISCONTINUED | OUTPATIENT
Start: 2022-06-30 | End: 2022-06-30

## 2022-06-30 RX ORDER — PROCHLORPERAZINE EDISYLATE 5 MG/ML
10 INJECTION INTRAMUSCULAR; INTRAVENOUS EVERY 6 HOURS PRN
Status: DISCONTINUED | OUTPATIENT
Start: 2022-06-30 | End: 2022-07-01

## 2022-06-30 RX ORDER — METOCLOPRAMIDE HYDROCHLORIDE 5 MG/ML
10 INJECTION INTRAMUSCULAR; INTRAVENOUS EVERY 8 HOURS PRN
Status: DISCONTINUED | OUTPATIENT
Start: 2022-06-30 | End: 2022-06-30 | Stop reason: HOSPADM

## 2022-06-30 RX ORDER — ATORVASTATIN CALCIUM 10 MG/1
10 TABLET, FILM COATED ORAL NIGHTLY
Status: DISCONTINUED | OUTPATIENT
Start: 2022-06-30 | End: 2022-07-01

## 2022-06-30 RX ORDER — SODIUM CHLORIDE 9 MG/ML
INJECTION, SOLUTION INTRAVENOUS CONTINUOUS
Status: DISCONTINUED | OUTPATIENT
Start: 2022-06-30 | End: 2022-07-01

## 2022-06-30 RX ORDER — TRAMADOL HYDROCHLORIDE 50 MG/1
50 TABLET ORAL EVERY 6 HOURS
Status: DISCONTINUED | OUTPATIENT
Start: 2022-06-30 | End: 2022-07-01

## 2022-06-30 RX ORDER — KETOROLAC TROMETHAMINE 30 MG/ML
INJECTION, SOLUTION INTRAMUSCULAR; INTRAVENOUS AS NEEDED
Status: DISCONTINUED | OUTPATIENT
Start: 2022-06-30 | End: 2022-06-30 | Stop reason: SURG

## 2022-06-30 RX ORDER — SODIUM CHLORIDE, SODIUM LACTATE, POTASSIUM CHLORIDE, CALCIUM CHLORIDE 600; 310; 30; 20 MG/100ML; MG/100ML; MG/100ML; MG/100ML
INJECTION, SOLUTION INTRAVENOUS CONTINUOUS
Status: DISCONTINUED | OUTPATIENT
Start: 2022-06-30 | End: 2022-06-30 | Stop reason: HOSPADM

## 2022-06-30 RX ORDER — DOCUSATE SODIUM 100 MG/1
100 CAPSULE, LIQUID FILLED ORAL 2 TIMES DAILY
Status: DISCONTINUED | OUTPATIENT
Start: 2022-06-30 | End: 2022-07-01

## 2022-06-30 RX ORDER — OXYCODONE HYDROCHLORIDE 5 MG/1
5 TABLET ORAL EVERY 4 HOURS PRN
Status: DISCONTINUED | OUTPATIENT
Start: 2022-06-30 | End: 2022-07-01

## 2022-06-30 RX ORDER — KETOROLAC TROMETHAMINE 15 MG/ML
15 INJECTION, SOLUTION INTRAMUSCULAR; INTRAVENOUS EVERY 6 HOURS
Status: COMPLETED | OUTPATIENT
Start: 2022-06-30 | End: 2022-07-01

## 2022-06-30 RX ORDER — HYDROMORPHONE HYDROCHLORIDE 1 MG/ML
0.6 INJECTION, SOLUTION INTRAMUSCULAR; INTRAVENOUS; SUBCUTANEOUS EVERY 5 MIN PRN
Status: DISCONTINUED | OUTPATIENT
Start: 2022-06-30 | End: 2022-06-30 | Stop reason: HOSPADM

## 2022-06-30 RX ORDER — OXYCODONE HYDROCHLORIDE 5 MG/1
2.5 TABLET ORAL EVERY 4 HOURS PRN
Status: DISCONTINUED | OUTPATIENT
Start: 2022-06-30 | End: 2022-07-01

## 2022-06-30 RX ORDER — SENNOSIDES 8.6 MG
17.2 TABLET ORAL NIGHTLY
Status: DISCONTINUED | OUTPATIENT
Start: 2022-06-30 | End: 2022-07-01

## 2022-06-30 RX ORDER — ZOLPIDEM TARTRATE 5 MG/1
5 TABLET ORAL NIGHTLY PRN
Status: DISCONTINUED | OUTPATIENT
Start: 2022-06-30 | End: 2022-07-01

## 2022-06-30 RX ORDER — ACETAMINOPHEN 500 MG
1000 TABLET ORAL ONCE
Status: DISCONTINUED | OUTPATIENT
Start: 2022-06-30 | End: 2022-06-30 | Stop reason: HOSPADM

## 2022-06-30 RX ORDER — DEXAMETHASONE SODIUM PHOSPHATE 4 MG/ML
VIAL (ML) INJECTION AS NEEDED
Status: DISCONTINUED | OUTPATIENT
Start: 2022-06-30 | End: 2022-06-30 | Stop reason: SURG

## 2022-06-30 RX ORDER — BISACODYL 10 MG
10 SUPPOSITORY, RECTAL RECTAL
Status: DISCONTINUED | OUTPATIENT
Start: 2022-06-30 | End: 2022-07-01

## 2022-06-30 RX ORDER — ACETAMINOPHEN 325 MG/1
TABLET ORAL
Status: COMPLETED
Start: 2022-06-30 | End: 2022-06-30

## 2022-06-30 RX ORDER — ONDANSETRON 2 MG/ML
4 INJECTION INTRAMUSCULAR; INTRAVENOUS EVERY 4 HOURS PRN
Status: DISCONTINUED | OUTPATIENT
Start: 2022-06-30 | End: 2022-07-01

## 2022-06-30 RX ORDER — SODIUM PHOSPHATE, DIBASIC AND SODIUM PHOSPHATE, MONOBASIC 7; 19 G/133ML; G/133ML
1 ENEMA RECTAL ONCE AS NEEDED
Status: DISCONTINUED | OUTPATIENT
Start: 2022-06-30 | End: 2022-07-01

## 2022-06-30 RX ORDER — PHENYLEPHRINE HCL 10 MG/ML
VIAL (ML) INJECTION AS NEEDED
Status: DISCONTINUED | OUTPATIENT
Start: 2022-06-30 | End: 2022-06-30 | Stop reason: SURG

## 2022-06-30 RX ORDER — ACETAMINOPHEN 325 MG/1
650 TABLET ORAL 4 TIMES DAILY
Status: DISCONTINUED | OUTPATIENT
Start: 2022-06-30 | End: 2022-07-01

## 2022-06-30 RX ORDER — SODIUM CHLORIDE, SODIUM LACTATE, POTASSIUM CHLORIDE, CALCIUM CHLORIDE 600; 310; 30; 20 MG/100ML; MG/100ML; MG/100ML; MG/100ML
INJECTION, SOLUTION INTRAVENOUS CONTINUOUS
Status: DISCONTINUED | OUTPATIENT
Start: 2022-06-30 | End: 2022-07-01

## 2022-06-30 RX ORDER — HYDROMORPHONE HYDROCHLORIDE 1 MG/ML
0.4 INJECTION, SOLUTION INTRAMUSCULAR; INTRAVENOUS; SUBCUTANEOUS EVERY 2 HOUR PRN
Status: DISCONTINUED | OUTPATIENT
Start: 2022-06-30 | End: 2022-07-01

## 2022-06-30 RX ADMIN — PHENYLEPHRINE HCL 100 MCG: 10 MG/ML VIAL (ML) INJECTION at 07:50:00

## 2022-06-30 RX ADMIN — PHENYLEPHRINE HCL 50 MCG: 10 MG/ML VIAL (ML) INJECTION at 08:16:00

## 2022-06-30 RX ADMIN — SODIUM CHLORIDE, SODIUM LACTATE, POTASSIUM CHLORIDE, CALCIUM CHLORIDE: 600; 310; 30; 20 INJECTION, SOLUTION INTRAVENOUS at 07:40:00

## 2022-06-30 RX ADMIN — KETOROLAC TROMETHAMINE 15 MG: 30 INJECTION, SOLUTION INTRAMUSCULAR; INTRAVENOUS at 08:28:00

## 2022-06-30 RX ADMIN — TRANEXAMIC ACID 1000 MG: 10 INJECTION, SOLUTION INTRAVENOUS at 07:30:00

## 2022-06-30 RX ADMIN — KETAMINE HYDROCHLORIDE 25 MG: 50 INJECTION, SOLUTION, CONCENTRATE INTRAMUSCULAR; INTRAVENOUS at 07:40:00

## 2022-06-30 RX ADMIN — PHENYLEPHRINE HCL 50 MCG: 10 MG/ML VIAL (ML) INJECTION at 07:56:00

## 2022-06-30 RX ADMIN — BUPIVACAINE HYDROCHLORIDE 1.6 ML: 7.5 INJECTION, SOLUTION INTRASPINAL at 07:20:00

## 2022-06-30 RX ADMIN — SODIUM CHLORIDE, SODIUM LACTATE, POTASSIUM CHLORIDE, CALCIUM CHLORIDE: 600; 310; 30; 20 INJECTION, SOLUTION INTRAVENOUS at 07:00:00

## 2022-06-30 RX ADMIN — CEFAZOLIN SODIUM/WATER 2 G: 2 G/20 ML SYRINGE (ML) INTRAVENOUS at 07:20:00

## 2022-06-30 RX ADMIN — DEXAMETHASONE SODIUM PHOSPHATE 8 MG: 4 MG/ML VIAL (ML) INJECTION at 07:43:00

## 2022-06-30 RX ADMIN — ONDANSETRON 4 MG: 2 INJECTION INTRAMUSCULAR; INTRAVENOUS at 08:29:00

## 2022-06-30 RX ADMIN — MIDAZOLAM HYDROCHLORIDE 2 MG: 1 INJECTION INTRAMUSCULAR; INTRAVENOUS at 07:05:00

## 2022-06-30 NOTE — OPERATIVE REPORT
PATIENT'S NAME: Moi Bauer   ATTENDING PHYSICIAN: Axel Zuleta MD   OPERATING PHYSICIAN: Axel Zuleta MD   PATIENT ACCOUNT#:   [de-identified]    LOCATION:  [address] [place] [address],[address] RECORD #:   [id number]    YOB: 1945  ADMISSION DATE:  6/30/2022           OPERATION DATE:  6/30/2022     OPERATIVE REPORT     PREOPERATIVE DIAGNOSIS:  Right hip primary osteoarthritis. POSTOPERATIVE DIAGNOSIS: Right hip primary osteoarthritis. PROCEDURE PERFORMED:  Right total hip arthroplasty with Corail size 11 standard offset stem, 32 mm ceramic head with +1 neck, size 50 Gription 100 series cup with +4 polyethylene liner. ASSISTANT:  LOBO Roman    Skilled assistance was needed for patient positioning, prepping and draping, instrument holding and passing, retracting and suturing. ANESTHESIA:  Spinal with regional block. INDICATIONS:  This is a 78 yrs old [place] female who presents with ongoing pain and difficulties with the hip consistent with the above diagnosis with failed conservative care. I reviewed the indications and benefits of the procedure. The patient understood and agreed to proceed. PROCEDURE:  The patient was taken to the operating room and placed in the supine position after administration of spinal anesthesia. A regional block was performed at the lower extremity. The patient was turned to the lateral decubitus position where She was positioned and padded appropriately. A time-out was performed confirming the hip was the appropriate operative site. The patient received IV antibiotics preoperatively. The hip was prepped and draped in the usual sterile fashion. A knife was used to make a longitudinal incision at the lateral aspect of the hip. Bovie was used for subcutaneous dissection and hemostasis. Dissection was carried down to the fascia, which was incised in line with the skin incision, revealing the greater trochanter and its attachments.   Bovie was used to incise the gluteus medius musculature in line with the muscle fibers carrying the incision across the anterior aspect of the greater trochanter and down in line with the vastus lateralis. The tendon was subperiosteally elevated off the anterior aspect of the greater trochanter down to  capsule, which was incised in a T-type fashion, partially excising the capsule to gain access to the joint. After adequate exposure, a pin was placed in the pelvis just cephalad to the acetabulum. The pin was used to measure leg length and offset before the hip was dislocated. The saw was used to resect the femoral head without difficulty. Irrisept irrigation was then placed in the wound and allowed to sit for 1 minute before evacuating the solution. Attention was turned to the acetabulum and retractors were placed anteriorly and posteriorly. Soft tissue dissection was carried out and osteophytes were removed until the acetabular rim was well visualized. The reamers were then used in sequential fashion up to size 49 mm with good bleeding bone throughout. The acetabular trial was impacted in place with good fixation. The trial was removed and the 50 mm cup was impacted in place with good fixation and good position. The center hole eliminator was placed in the cup. The +4 polyethylene liner was impacted with good fixation. Attention was turned to the femur. The cookie cutter was used to gain lateral access to the canal.  The canal finder was used followed by the broaches up to size 11, which found to be appropriate fit and this was left in place for trialing. The calcar reamer was used. A standard offset neck and +1 head was placed and the hip was reduced. It was put through good range of motion with good leg length and offset, and no instability. The hip was dislocated and all the trials were removed. The stem was placed down the canal and impacted in place with good fixation.   A trial reduction was again performed with good range of motion and no instability. The trial head was removed. After cleaning the [facility], the final head was impacted in place with good fixation. The final reduction was performed and the hip was put through good range of motion with good leg length and offset and no instability. The pin was removed from the pelvis. Irrisept irrigation was placed in the wound and allowed to sit for 1 minute before evacuating the solution. A #2 Orthocord suture was used in a figure-of-eight fashion to close the gluteus medius tendon, 1 being placed through drill holes. A running #1 Vicryl suture was used proximally and distally to the deep soft tissue. The fascia was closed in a similar fashion with a #2 Orthocord suture in a figure-of-eight fashion centrally and running fashion proximally and distally. Subcutaneous tissue was closed in layers, most superficially with an inverted 2-0 Vicryl suture and the skin was closed with staples. A sterile dressing was placed. The patient tolerated the procedure well. There were no complications. Blood loss was approximately 150 ml. The patient was taken to Recovery in stable condition.

## 2022-06-30 NOTE — BRIEF OP NOTE
Pre-Operative Diagnosis: OSTEOARTHRITIS RIGHT HIP     Post-Operative Diagnosis: OSTEOARTHRITIS RIGHT HIP      Procedure Performed:   RIGHT LATERAL TOTAL HIP ARTHROPLASTY    Surgeon(s) and Role:     * Juhi Concepcion MD - Primary    Assistant(s):  Surgical Assistant.: Katy Hatch  PA: Jessi Cardenas PA-C     Surgical Findings: OA     Specimen: bone     Estimated Blood Loss: Blood Output: 150 mL (6/30/2022  8:29 AM)      Dictation Number:  Hailey Jiménez MD  6/30/2022  8:40 AM

## 2022-06-30 NOTE — ANESTHESIA POSTPROCEDURE EVALUATION
[address] Patient Status:  Outpatient in a Bed   Age/Gender 78year old female MRN [id number]   [facility] SURGERY Attending Jesu Woody MD   Hosp Day # 0 [facility] Yandy Kramer MD       Anesthesia Post-op Note    RIGHT LATERAL TOTAL HIP ARTHROPLASTY    Procedure Summary     Date: 06/30/22 Room / Location: [address] [place] [address] OR 14 / 1404 [facility] OR    Anesthesia Start: 0700 Anesthesia Stop: 7918    Procedure: RIGHT LATERAL TOTAL HIP ARTHROPLASTY (Right Hip) Diagnosis: (OSTEOARTHRITIS RIGHT HIP)    Surgeons: Jesu Woody MD Anesthesiologist: Sariah Rangel MD    Anesthesia Type: spinal ASA Status: 2          Anesthesia Type: spinal    Vitals Value Taken Time   BP 92/53 06/30/22 0845   Temp 97.4 06/30/22 0845   Pulse 68 06/30/22 0845   Resp 16 06/30/22 0845   SpO2 96 06/30/22 0845       Patient Location: PACU    Anesthesia Type: spinal    Airway Patency: patent    Postop Pain Control: adequate    Mental Status: preanesthetic baseline    Nausea/Vomiting: none    Cardiopulmonary/Hydration status: stable euvolemic    Complications: no apparent anesthesia related complications    Postop vital signs: stable    Dental Exam: Unchanged from Preop    Patient to be discharged from PACU when criteria met.

## 2022-06-30 NOTE — INTERVAL H&P NOTE
Pre-op Diagnosis: OSTEOARTHRITIS RIGHT HIP    The above referenced H&P was reviewed by Isra Barraza MD on 6/30/2022, the patient was examined and no significant changes have occurred in the patient's condition since the H&P was performed. I discussed with the patient and/or legal representative the potential benefits, risks and side effects of this procedure; the likelihood of the patient achieving goals; and potential problems that might occur during recuperation. I discussed reasonable alternatives to the procedure, including risks, benefits and side effects related to the alternatives and risks related to not receiving this procedure. We will proceed with procedure as planned.

## 2022-06-30 NOTE — ANESTHESIA PROCEDURE NOTES
Spinal Block  Performed by: Leighann Ceja MD  Authorized by: Leighann Ceja MD       General Information and Staff    Start Time:  6/30/2022 7:10 AM  End Time:  6/30/2022 7:20 AM  Anesthesiologist:  Leighann Ceja MD  Performed by:   Anesthesiologist  Site identification: surface landmarks    Preanesthetic Checklist: patient identified, IV checked, risks and benefits discussed, monitors and equipment checked, pre-op evaluation, timeout performed, anesthesia consent and sterile technique used      Procedure Details    Patient Position:  Sitting  Prep: ChloraPrep    Monitoring:  Cardiac monitor, heart rate and continuous pulse ox  Approach:  Midline  Location:  L3-4  Injection Technique:  Single-shot    Needle    Needle Type:  Pencil-tip  Needle Gauge:  25 G  Needle Length:  3.5 in    Assessment    Sensory Level:  T8  Events: clear CSF, CSF aspirated, well tolerated and blood negative      Additional Comments     Bupiv 0.75% w/dextrose 1.6mL

## 2022-07-01 VITALS
RESPIRATION RATE: 16 BRPM | HEART RATE: 79 BPM | BODY MASS INDEX: 28.21 KG/M2 | HEIGHT: 66 IN | DIASTOLIC BLOOD PRESSURE: 57 MMHG | TEMPERATURE: 98 F | OXYGEN SATURATION: 95 % | SYSTOLIC BLOOD PRESSURE: 107 MMHG | WEIGHT: 175.5 LBS

## 2022-07-01 LAB
HCT VFR BLD AUTO: 40.4 %
HGB BLD-MCNC: 13 G/DL

## 2022-07-01 PROCEDURE — 85018 HEMOGLOBIN: CPT | Performed by: PHYSICIAN ASSISTANT

## 2022-07-01 PROCEDURE — 97165 OT EVAL LOW COMPLEX 30 MIN: CPT

## 2022-07-01 PROCEDURE — 97530 THERAPEUTIC ACTIVITIES: CPT

## 2022-07-01 PROCEDURE — 85014 HEMATOCRIT: CPT | Performed by: PHYSICIAN ASSISTANT

## 2022-07-01 PROCEDURE — 97535 SELF CARE MNGMENT TRAINING: CPT

## 2022-07-01 PROCEDURE — 97116 GAIT TRAINING THERAPY: CPT

## 2022-07-01 NOTE — PLAN OF CARE
Pt A&Ox4. VSS on room air, IS encouraged. SCDs to BLE. Incision to R hip with aquacel drsg C/D/I. Gel ice on for pain and swelling. Spandagrip to RLE. Pt tolerating general diet. Last BM 6/28/22. Miralax given this AM. Voiding without difficulty. Pain controlled with scheduled pain medications. Fall precautions in place and pt reminded to \"call; don't fall. \" Plan to discharge home with home health when cleared. POC discussed with pt.

## 2022-07-01 NOTE — PLAN OF CARE
Assumed care of pt @ 1200   Pt is A/Ox 4. On RA, VSS  IVF infusing, saline locked when tolerating oral intake  Tolerating diet. PT/OT recommending- home  Pt states pain is moderate but well controlled when not moving, states pain is severe with movement. PRN and scheduled pain medication given  Up as tolerated with 1-2 assist and walker  Intake/outputs WNL. Plan poss dc tomorrow afternoon    Updated POC with patient and family. Will continue to monitor.         Problem: SKIN/TISSUE INTEGRITY - ADULT  Goal: Skin integrity remains intact  Description: INTERVENTIONS  - Assess and document risk factors for pressure ulcer development  - Assess and document skin integrity  - Monitor for areas of redness and/or skin breakdown  - Initiate interventions, skin care algorithm/standards of care as needed  Outcome: Progressing  Goal: Incision(s), wounds(s) or drain site(s) healing without S/S of infection  Description: INTERVENTIONS:  - Assess and document risk factors for pressure ulcer development  - Assess and document skin integrity  - Assess and document dressing/incision, wound bed, drain sites and surrounding tissue  - Implement wound care per orders  - Initiate isolation precautions as appropriate  - Initiate Pressure Ulcer prevention bundle as indicated  Outcome: Progressing     Problem: MUSCULOSKELETAL - ADULT  Goal: Return mobility to safest level of function  Description: INTERVENTIONS:  - Assess patient stability and activity tolerance for standing, transferring and ambulating w/ or w/o assistive devices  - Assist with transfers and ambulation using safe patient handling equipment as needed  - Ensure adequate protection for wounds/incisions during mobilization  - Obtain PT/OT consults as needed  - Advance activity as appropriate  - Communicate ordered activity level and limitations with patient/family  Outcome: Progressing  Goal: Maintain proper alignment of affected body part  Description: INTERVENTIONS:  - Support and protect limb and body alignment per provider's orders  - Instruct and reinforce with patient and family use of appropriate assistive device and precautions (e.g. spinal or hip dislocation precautions)  Outcome: Progressing     Problem: Impaired Functional Mobility  Goal: Achieve highest/safest level of mobility/gait  Description: Interventions:  - Assess patient's functional ability and stability  - Promote increasing activity/tolerance for mobility and gait  - Educate and engage patient/family in tolerated activity level and precautions  - Recommend use of  RW for transfers and ambulation  Outcome: Progressing     Problem: Impaired Activities of Daily Living  Goal: Achieve highest/safest level of independence in self care  Description: Interventions:  - Assess ability and encourage patient to participate in ADLs to maximize function  - Promote sitting position while performing ADLs such as feeding, grooming, and bathing  - Educate and encourage patient/family in tolerated functional activity level and precautions during self-care  - Encourage patient to incorporate impaired side during daily activities to promote function  Outcome: Progressing     Problem: PAIN - ADULT  Goal: Verbalizes/displays adequate comfort level or patient's stated pain goal  Description: INTERVENTIONS:  - Encourage pt to monitor pain and request assistance  - Assess pain using appropriate pain scale  - Administer analgesics based on type and severity of pain and evaluate response  - Implement non-pharmacological measures as appropriate and evaluate response  - Consider cultural and social influences on pain and pain management  - Manage/alleviate anxiety  - Utilize distraction and/or relaxation techniques  - Monitor for opioid side effects  - Notify MD/LIP if interventions unsuccessful or patient reports new pain  - Anticipate increased pain with activity and pre-medicate as appropriate  Outcome: Progressing     Problem: RISK FOR INFECTION - ADULT  Goal: Absence of fever/infection during anticipated neutropenic period  Description: INTERVENTIONS  - Monitor WBC  - Administer growth factors as ordered  - Implement neutropenic guidelines  Outcome: Progressing     Problem: SAFETY ADULT - FALL  Goal: Free from fall injury  Description: INTERVENTIONS:  - Assess pt frequently for physical needs  - Identify cognitive and physical deficits and behaviors that affect risk of falls.   - Spring fall precautions as indicated by assessment.  - Educate pt/family on patient safety including physical limitations  - Instruct pt to call for assistance with activity based on assessment  - Modify environment to reduce risk of injury  - Provide assistive devices as appropriate  - Consider OT/PT consult to assist with strengthening/mobility  - Encourage toileting schedule  Outcome: Progressing     Problem: DISCHARGE PLANNING  Goal: Discharge to home or other facility with appropriate resources  Description: INTERVENTIONS:  - Identify barriers to discharge w/pt and caregiver  - Include patient/family/discharge partner in discharge planning  - Arrange for needed discharge resources and transportation as appropriate  - Identify discharge learning needs (meds, wound care, etc)  - Arrange for interpreters to assist at discharge as needed  - Consider post-discharge preferences of patient/family/discharge partner  - Complete POLST form as appropriate  - Assess patient's ability to be responsible for managing their own health  - Refer to Case Management Department for coordinating discharge planning if the patient needs post-hospital services based on physician/LIP order or complex needs related to functional status, cognitive ability or social support system  Outcome: Progressing

## 2022-07-01 NOTE — PROGRESS NOTES
Pt cleared by all MD's for discharge. Discharge education completed at bedside, all questions answered. Discharge education video played for pt. PIV removed, belongings packed. Pt discharging to home via wheelchair.

## 2022-07-01 NOTE — PLAN OF CARE
Assumed care at [address]. Alert and oriented x4. Pain controlled with scheduled medications. Rt hip dressing c/d/I. Fall precautions maintained per protocol. Plan for OT eval. Call light in reach at bedside Will continue to monitor.       Problem: SKIN/TISSUE INTEGRITY - ADULT  Goal: Skin integrity remains intact  Description: INTERVENTIONS  - Assess and document risk factors for pressure ulcer development  - Assess and document skin integrity  - Monitor for areas of redness and/or skin breakdown  - Initiate interventions, skin care algorithm/standards of care as needed  Outcome: Progressing  Goal: Incision(s), wounds(s) or drain site(s) healing without S/S of infection  Description: INTERVENTIONS:  - Assess and document risk factors for pressure ulcer development  - Assess and document skin integrity  - Assess and document dressing/incision, wound bed, drain sites and surrounding tissue  - Implement wound care per orders  - Initiate isolation precautions as appropriate  - Initiate Pressure Ulcer prevention bundle as indicated  Outcome: Progressing

## 2022-07-18 ENCOUNTER — OFFICE VISIT (OUTPATIENT)
Dept: PHYSICAL THERAPY | Age: 77
End: 2022-07-18
Attending: ORTHOPAEDIC SURGERY
Payer: MEDICARE

## 2022-07-18 DIAGNOSIS — Z96.641 HISTORY OF RIGHT HIP REPLACEMENT: ICD-10-CM

## 2022-07-18 PROCEDURE — 97161 PT EVAL LOW COMPLEX 20 MIN: CPT | Performed by: PHYSICAL THERAPIST

## 2022-07-20 ENCOUNTER — OFFICE VISIT (OUTPATIENT)
Dept: PHYSICAL THERAPY | Age: 77
End: 2022-07-20
Attending: ORTHOPAEDIC SURGERY
Payer: MEDICARE

## 2022-07-20 DIAGNOSIS — Z96.641 HISTORY OF RIGHT HIP REPLACEMENT: ICD-10-CM

## 2022-07-20 PROCEDURE — 97110 THERAPEUTIC EXERCISES: CPT | Performed by: PHYSICAL THERAPIST

## 2022-07-20 NOTE — PROGRESS NOTES
Dx: Right NATI, lateral approach           Authorized # of Visits:  8  Fall Risk: standard         Precautions: no active hip abd/extension until 6 weeks post op, DOS  2022      Subjective:   Patient states pain is at outside of right hip   Current Pain Ratin/10  Objective:   Treatment session included review of precautions that were confirmed with Dr. Jeremy Pyle office. Instructed patient in proper use of cane and spinal stabilization principles       Assessment:   Patient tolerated the session well reporting decreased pain after the session. She had no difficulty with progression to cane with SBA. Recommend she continue with rolling walker until next week secondary to balance and pain concerns     Plan:   Physical therapy 2x/wk with progression to ambulation with cane. Date: 2022  Tx#:  Date: Tx#: 3/ Date: Tx#: 4/ Date: Tx#: 5/ Date: Tx#: 6/ Date: Tx#: 7/ Date: Tx#: 8/   TherEx TherEx TherEx TherEx TherEx TherEx TherEx   Nustep L3  8 minutes          Right hip PROM within pain free ROM. TKE right and left knee > 15 reps with 5 second hold          Left knee to chest with right 3 reps x 20 seconds          Right heel slide with abdominal bracing via elbow dig          Left bent knee fallouts                                         Charges:  TherEx 3        Total Timed Treatment: 45 min  Total Treatment Time: 45 min

## 2022-07-25 ENCOUNTER — OFFICE VISIT (OUTPATIENT)
Dept: PHYSICAL THERAPY | Age: 77
End: 2022-07-25
Attending: ORTHOPAEDIC SURGERY
Payer: MEDICARE

## 2022-07-25 PROCEDURE — 97140 MANUAL THERAPY 1/> REGIONS: CPT | Performed by: PHYSICAL THERAPIST

## 2022-07-25 PROCEDURE — 97110 THERAPEUTIC EXERCISES: CPT | Performed by: PHYSICAL THERAPIST

## 2022-07-25 NOTE — PROGRESS NOTES
Dx: Right NATI, lateral approach           Authorized # of Visits:  8  Fall Risk: standard         Precautions: no active hip abd/extension until 6 weeks post op, DOS  6/30/2022      Subjective:     Current Pain Rating: 3-4/10  Objective:   Treatment session included ambulation with cane. Patient demonstrated preference for cane on right side. Right hip knee remain in slight flexion in supine. Pull decreased with left hip/knee in flexion       Assessment:   Patient's preference for cane on right indicates she will be utilizing the cane more for balance versus pain. She had no difficulty on 4 inch step with UE support. Recommend progression to 6 inch step      Plan:   Physical therapy 2x/wk with progression to ambulation with cane as able to tolerate     Date: 7/20/2022  Tx#: 2/8 Date: 7/25/2022  Tx#: 3/8 Date: Tx#: 4/ Date: Tx#: 5/ Date: Tx#: 6/ Date: Tx#: 7/ Date: Tx#: 8/   TherEx TherEx TherEx TherEx TherEx TherEx TherEx   Nustep L3  8 minutes  Nustep L5 8 minutes         Right hip PROM within pain free ROM. Forward step up 4 inch step 10 reps each UE support         TKE right and left knee > 15 reps with 5 second hold  Lateral step up and over 10 reps each UE support        Left knee to chest with right 3 reps x 20 seconds  TKE > 20 reps each with 5 second hold         Right heel slide with abdominal bracing via elbow dig  Left hip single knee to chest 4 reps x 30 seconds each        Left bent knee fallouts  Right heel slides > 20 reps          Sit to stand 10 reps no support         Right hip quad/hip flexor soft tissue release                      Charges:  TherEx 2, manual therapy        Total Timed Treatment: 50 min  Total Treatment Time: 50  min

## 2022-07-27 ENCOUNTER — OFFICE VISIT (OUTPATIENT)
Dept: PHYSICAL THERAPY | Age: 77
End: 2022-07-27
Attending: ORTHOPAEDIC SURGERY
Payer: MEDICARE

## 2022-07-27 PROCEDURE — 97110 THERAPEUTIC EXERCISES: CPT | Performed by: PHYSICAL THERAPIST

## 2022-07-27 NOTE — PROGRESS NOTES
Dx: Right NATI, lateral approach           Authorized # of Visits:  8  Fall Risk: standard         Precautions: no active hip abd/extension until 6 weeks post op, DOS  6/30/2022      Subjective:   Patient states she took some steps today without her walker   Current Pain Rating: 3-4/10  Objective:   Progressed to 6 inch step with closed chain exercises   Right knee extension improved following TKE and release of right hip flexor. She has tendency to draw right hip into flexion   Gait: right LE in toe out position       Assessment:   Patient has natural tendency towards toe out right LE. Right hip flexor tightness/tenderness persists. Right knee extension is improved since initial visit. As patient remains on precautions, unable to progress more aggressively with right hip ROM     Plan:   Physical therapy 2x/wk with progression to ambulation with cane as able to tolerate     Date: 7/20/2022  Tx#: 2/8 Date: 7/25/2022  Tx#: 3/8 Date: 7/27/2022  Tx#: 4/8 Date: Tx#: 5/ Date: Tx#: 6/ Date: Tx#: 7/ Date: Tx#: 8/   TherEx TherEx TherEx TherEx TherEx TherEx TherEx   Nustep L3  8 minutes  Nustep L5 8 minutes  Nustep L5 8 minutes        Right hip PROM within pain free ROM.   Forward step up 4 inch step 10 reps each UE support  Bilateral heel raise 20 reps       TKE right and left knee > 15 reps with 5 second hold  Lateral step up and over 10 reps each UE support Forward step up 6 inch step 10 reps each       Left knee to chest with right 3 reps x 20 seconds  TKE > 20 reps each with 5 second hold  Lateral step up/down 6 inch step 10 reps x 2 with UE support       Right heel slide with abdominal bracing via elbow dig  Left hip single knee to chest 4 reps x 30 seconds each Rhythmic stabilization contract shoulders 10 reps, 10 reps shoulder/pelvis        Left bent knee fallouts  Right heel slides > 20 reps  Forward step with bilateral overhead reach 10 reps each        Sit to stand 10 reps no support Forward step with bilateral same side reach 10 reps each         Right hip quad/hip flexor soft tissue release  TKE > 20 reps with 5 second hold each          Left LE single knee to chest PROM with right hip/knee in extension          Left bent knee fallouts                      Charges:  TherEx 3        Total Timed Treatment: 45 min  Total Treatment Time: 45  min

## 2022-08-01 ENCOUNTER — OFFICE VISIT (OUTPATIENT)
Dept: PHYSICAL THERAPY | Age: 77
End: 2022-08-01
Attending: ORTHOPAEDIC SURGERY
Payer: MEDICARE

## 2022-08-01 PROCEDURE — 97110 THERAPEUTIC EXERCISES: CPT

## 2022-08-01 NOTE — PROGRESS NOTES
Dx: Right NATI, lateral approach           Authorized # of Visits:  8  Fall Risk: standard         Precautions: no active hip abd/extension until 6 weeks post op, DOS  2022      Subjective:   Feels that she is making progress, but not as quickly as she want to be progressing. States that she has been using her cane more and more at home. Using RW outside of the house. Current Pain Ratin/10  Objective:   Treatment per log below. Assessment:   Overall, did well with all activities today with increased tolerance for exercise progression. Plan:   Physical therapy 2x/wk with progression to ambulation with cane as able to tolerate     Date: 2022  Tx#: 2 Date: 2022  Tx#: 3/8 Date: 2022  Tx#:  Date:2022  Tx#: 5/ Date: Tx#: 6/ Date: Tx#: 7/ Date: Tx#: 8/   TherEx TherEx TherEx TherEx TherEx TherEx TherEx   Nustep L3  8 minutes  Nustep L5 8 minutes  Nustep L5 8 minutes  Nustep L5 8 minutes       Right hip PROM within pain free ROM.   Forward step up 4 inch step 10 reps each UE support  Bilateral heel raise 20 reps Bilateral heel raise 20 reps      TKE right and left knee > 15 reps with 5 second hold  Lateral step up and over 10 reps each UE support Forward step up 6 inch step 10 reps each Forward step up 6 inch step 10 reps each      Left knee to chest with right 3 reps x 20 seconds  TKE > 20 reps each with 5 second hold  Lateral step up/down 6 inch step 10 reps x 2 with UE support Lateral step up/down 6 inch step 10 reps x 2 with UE support      Right heel slide with abdominal bracing via elbow dig  Left hip single knee to chest 4 reps x 30 seconds each Rhythmic stabilization contract shoulders 10 reps, 10 reps shoulder/pelvis  Rhythmic stabilization contract shoulders 10 reps, 10 reps shoulder/pelvis      Left bent knee fallouts  Right heel slides > 20 reps  Forward step with bilateral overhead reach 10 reps each        Sit to stand 10 reps no support Forward step with bilateral same side reach 10 reps each          Right hip quad/hip flexor soft tissue release  TKE > 20 reps with 5 second hold each   Shuttle L5 bilateral press x20; L3.5 SIngle Leg x 20        Left LE single knee to chest PROM with right hip/knee in extension  HL ABD red x 20        Left bent knee fallouts                      Charges:  TherEx 3        Total Timed Treatment: 45 min  Total Treatment Time: 45  min

## 2022-08-03 ENCOUNTER — OFFICE VISIT (OUTPATIENT)
Dept: PHYSICAL THERAPY | Age: 77
End: 2022-08-03
Attending: ORTHOPAEDIC SURGERY
Payer: MEDICARE

## 2022-08-03 PROCEDURE — 97110 THERAPEUTIC EXERCISES: CPT

## 2022-08-03 NOTE — PROGRESS NOTES
Dx: Right NATI, lateral approach           Authorized # of Visits:  8  Fall Risk: standard         Precautions: no active hip abd/extension until 6 weeks post op, DOS  2022      Subjective:   States that she continues to improve \"bit by bit. \"      Current Pain Ratin/10  Objective:   Treatment per log below. Assessment:   Overall, did well with all activities today with increased tolerance for exercise progression. Improved ability for gait without increased limitation. Plan:   Physical therapy 2x/wk with progression to ambulation with cane as able to tolerate     Date: 2022  Tx#: 2 Date: 2022  Tx#: 3/8 Date: 2022  Tx#:  Date:2022  Tx#:  Date:8/3/2022  Tx#: 6/ Date: Tx#: 7/ Date: Tx#: 8/   TherEx TherEx TherEx TherEx TherEx TherEx TherEx   Nustep L3  8 minutes  Nustep L5 8 minutes  Nustep L5 8 minutes  Nustep L5 8 minutes  Nustep L5 8 minutes     Right hip PROM within pain free ROM.   Forward step up 4 inch step 10 reps each UE support  Bilateral heel raise 20 reps Bilateral heel raise 20 reps Forward step up 6 inch step 10 reps each     TKE right and left knee > 15 reps with 5 second hold  Lateral step up and over 10 reps each UE support Forward step up 6 inch step 10 reps each Forward step up 6 inch step 10 reps each Lateral step up/down 6 inch step 10 reps x 2 with UE support     Left knee to chest with right 3 reps x 20 seconds  TKE > 20 reps each with 5 second hold  Lateral step up/down 6 inch step 10 reps x 2 with UE support Lateral step up/down 6 inch step 10 reps x 2 with UE support Rhythmic stabilization contract shoulders 10 reps, 10 reps shoulder/pelvis     Right heel slide with abdominal bracing via elbow dig  Left hip single knee to chest 4 reps x 30 seconds each Rhythmic stabilization contract shoulders 10 reps, 10 reps shoulder/pelvis  Rhythmic stabilization contract shoulders 10 reps, 10 reps shoulder/pelvis Shuttle L5 bilateral press x20; L3.5 SIngle Leg x 20     Left bent knee fallouts  Right heel slides > 20 reps  Forward step with bilateral overhead reach 10 reps each  HL ABD red x 20      Sit to stand 10 reps no support Forward step with bilateral same side reach 10 reps each    Standing hip flexor stretch 10 sec x 5      Right hip quad/hip flexor soft tissue release  TKE > 20 reps with 5 second hold each   Shuttle L5 bilateral press x20; L3.5 SIngle Leg x 20        Left LE single knee to chest PROM with right hip/knee in extension  HL ABD red x 20        Left bent knee fallouts                      Charges:  TherEx 3        Total Timed Treatment: 45 min  Total Treatment Time: 45  min

## 2022-08-08 ENCOUNTER — OFFICE VISIT (OUTPATIENT)
Dept: PHYSICAL THERAPY | Age: 77
End: 2022-08-08
Attending: ORTHOPAEDIC SURGERY
Payer: MEDICARE

## 2022-08-08 PROCEDURE — 97110 THERAPEUTIC EXERCISES: CPT

## 2022-08-08 NOTE — PROGRESS NOTES
Dx: Right NATI, lateral approach           Authorized # of Visits:  8  Fall Risk: standard         Precautions: no active hip abd/extension until 6 weeks post op, DOS  2022      Subjective:   States that she continues to experience incisional pain with stairs. Notes that she is walking around the house with her cane mostly, but will go without from time to time. Current Pain Ratin/10  Objective:   Treatment per log below. Ambulated in clinic without device with SBA+1      Assessment:   Overall, did well with all activities today with increased strength across the hip with more stability with stance. Plan:   Physical therapy 2x/wk with progression to ambulation with cane as able to tolerate     Date: 2022  Tx#:  Date: 2022  Tx#: 3/8 Date: 2022  Tx#:  Date:2022  Tx#: 5/ Date:8/3/2022  Tx#: 6/ Date: 2022  Tx#: 7/ Date: Tx#: 8/   TherEx TherEx TherEx TherEx TherEx TherEx TherEx   Nustep L3  8 minutes  Nustep L5 8 minutes  Nustep L5 8 minutes  Nustep L5 8 minutes  Nustep L5 8 minutes Nustep L5 8 minutes    Right hip PROM within pain free ROM.   Forward step up 4 inch step 10 reps each UE support  Bilateral heel raise 20 reps Bilateral heel raise 20 reps Forward step up 6 inch step 10 reps each Standing hip flexor stretch 10 sec x 5    TKE right and left knee > 15 reps with 5 second hold  Lateral step up and over 10 reps each UE support Forward step up 6 inch step 10 reps each Forward step up 6 inch step 10 reps each Lateral step up/down 6 inch step 10 reps x 2 with UE support Rhythmic stabilization contract shoulders 10 reps, 10 reps shoulder/pelvis    Left knee to chest with right 3 reps x 20 seconds  TKE > 20 reps each with 5 second hold  Lateral step up/down 6 inch step 10 reps x 2 with UE support Lateral step up/down 6 inch step 10 reps x 2 with UE support Rhythmic stabilization contract shoulders 10 reps, 10 reps shoulder/pelvis Forward step up 6 inch step 10 reps each Right heel slide with abdominal bracing via elbow dig  Left hip single knee to chest 4 reps x 30 seconds each Rhythmic stabilization contract shoulders 10 reps, 10 reps shoulder/pelvis  Rhythmic stabilization contract shoulders 10 reps, 10 reps shoulder/pelvis Shuttle L5 bilateral press x20; L3.5 SIngle Leg x 20 Lateral step up/down 6 inch step 10 reps x 2 with UE support    Left bent knee fallouts  Right heel slides > 20 reps  Forward step with bilateral overhead reach 10 reps each  HL ABD red x 20 Shuttle L5 bilateral press x20; L3.5 SIngle Leg x 20     Sit to stand 10 reps no support Forward step with bilateral same side reach 10 reps each    Standing hip flexor stretch 10 sec x 5      Right hip quad/hip flexor soft tissue release  TKE > 20 reps with 5 second hold each   Shuttle L5 bilateral press x20; L3.5 SIngle Leg x 20        Left LE single knee to chest PROM with right hip/knee in extension  HL ABD red x 20        Left bent knee fallouts                      Charges:  TherEx 3        Total Timed Treatment: 45 min  Total Treatment Time: 45  min

## 2022-08-10 ENCOUNTER — OFFICE VISIT (OUTPATIENT)
Dept: PHYSICAL THERAPY | Age: 77
End: 2022-08-10
Attending: ORTHOPAEDIC SURGERY
Payer: MEDICARE

## 2022-08-10 PROCEDURE — 97110 THERAPEUTIC EXERCISES: CPT | Performed by: PHYSICAL THERAPIST

## 2022-08-10 NOTE — PROGRESS NOTES
Dx: Right NATI, lateral approach           Authorized # of Visits:  8  Fall Risk: standard         Precautions: no active hip abd/extension until 6 weeks post op, DOS  2022     Progress Summary  Pt has attended 8 visits in Physical Therapy. Subjective:   Patient states she is using her cane mostly but she is using her walker at night when she gets up to go the bathroom. Current Pain Ratin/10  Objective:   Gait: Patient has progressed to ambulation with cane. Gait is steady with and without cane. She is slight forward flexion during gait and static standing. Right knee lacks terminal extension. With verbal cues for upright standing, there is right anterior hip pulling    Stairs: She is able ascend/descend 6 inch step. There is a natural right toe out position. Decreased hip abductor strength is present with ascend/descending. ROM/flexibility: Bilateral hip flexor and hamstring tightness. Quads have not been assessed. Strength: hooklying hip abduction/hip flexion 5/5. PF 3+/5 bilat. Bridging initiated today with no pain. Palpation: Mild lateral hip pain at the incision. Lateral hip pain with ascending a step is decreased with cues to avoid pelvic shift     Assessment:   Patient apprehension with gait/ambulation is improving. As she moves from hip precautions, she will benefit from additional work on flexibility, gait, stairs/curbs. The anterior hip tightness may be combination of limited right hip extension as well as hip flexor tightness. She also lacks 5-10 degrees of right knee extension. With over pressure into right knee extension, patient experiences more anterior hip stretch versus right knee pain/stretch. Discussed progress with the patient, recommend continuing therapy for the remaining 4 visits that are scheduled. Plan: Continue skilled Physical Therapy 2 x/week or a total of 4 visits over a 90 day period.  Treatment will include: ROM,progressive closed chain strengthening Patient/Family/Caregiver was advised of these findings, precautions, and treatment options and has agreed to actively participate in planning and for this course of care. Thank you for your referral. If you have any questions, please contact me at Dept: [phone]. Sincerely,  Electronically signed by therapist: Adriane Upton PT     Physician's certification required:  Yes  Please co-sign or sign and return this letter via fax as soon as possible to [phone]. I certify the need for these services furnished under this plan of treatment and while under my care. X___________________________________________________ Date____________________    Certification From: 7/91/6981  To:11/8/2022     Date: 7/20/2022  Tx#: 2/8 Date: 7/25/2022  Tx#: 3/8 Date: 7/27/2022  Tx#: 4/8 Date:8/1/2022  Tx#: 5/ Date:8/3/2022  Tx#: 6/ Date: 8/8/2022  Tx#: 7/ Date: 8/10/2022  Tx#: 8/8   TherEx TherEx TherEx TherEx TherEx TherEx TherEx   Nustep L3  8 minutes  Nustep L5 8 minutes  Nustep L5 8 minutes  Nustep L5 8 minutes  Nustep L5 8 minutes Nustep L5 8 minutes Nustep L5 8 minutes    Right hip PROM within pain free ROM.   Forward step up 4 inch step 10 reps each UE support  Bilateral heel raise 20 reps Bilateral heel raise 20 reps Forward step up 6 inch step 10 reps each Standing hip flexor stretch 10 sec x 5 Lateral step up/up down 4 inch step with UE support 10 reps each    TKE right and left knee > 15 reps with 5 second hold  Lateral step up and over 10 reps each UE support Forward step up 6 inch step 10 reps each Forward step up 6 inch step 10 reps each Lateral step up/down 6 inch step 10 reps x 2 with UE support Rhythmic stabilization contract shoulders 10 reps, 10 reps shoulder/pelvis Ascend 6 inch step with UE support > 10 reps each tactile cues for right LE   Left knee to chest with right 3 reps x 20 seconds  TKE > 20 reps each with 5 second hold  Lateral step up/down 6 inch step 10 reps x 2 with UE support Lateral step up/down 6 inch step 10 reps x 2 with UE support Rhythmic stabilization contract shoulders 10 reps, 10 reps shoulder/pelvis Forward step up 6 inch step 10 reps each Rhythmic stabilization/perturbations standing split and parallel stance > 10 each    Right heel slide with abdominal bracing via elbow dig  Left hip single knee to chest 4 reps x 30 seconds each Rhythmic stabilization contract shoulders 10 reps, 10 reps shoulder/pelvis  Rhythmic stabilization contract shoulders 10 reps, 10 reps shoulder/pelvis Shuttle L5 bilateral press x20; L3.5 SIngle Leg x 20 Lateral step up/down 6 inch step 10 reps x 2 with UE support Lateral step and return with UE support (HEP)   Left bent knee fallouts  Right heel slides > 20 reps  Forward step with bilateral overhead reach 10 reps each  HL ABD red x 20 Shuttle L5 bilateral press x20; L3.5 SIngle Leg x 20 Single knee to chest with left LE, right LE extended  (HEP)    Sit to stand 10 reps no support Forward step with bilateral same side reach 10 reps each    Standing hip flexor stretch 10 sec x 5  Single leg stance with UE support no pelvic drop (HEP)    Right hip quad/hip flexor soft tissue release  TKE > 20 reps with 5 second hold each   Shuttle L5 bilateral press x20; L3.5 SIngle Leg x 20   Bridge > 15 reps - good     Left LE single knee to chest PROM with right hip/knee in extension  HL ABD red x 20        Left bent knee fallouts                      Charges:  TherEx 3        Total Timed Treatment: 45 min  Total Treatment Time: 45  min

## 2022-08-15 ENCOUNTER — OFFICE VISIT (OUTPATIENT)
Dept: PHYSICAL THERAPY | Age: 77
End: 2022-08-15
Attending: ORTHOPAEDIC SURGERY
Payer: MEDICARE

## 2022-08-15 PROCEDURE — 97110 THERAPEUTIC EXERCISES: CPT | Performed by: PHYSICAL THERAPIST

## 2022-08-15 NOTE — PROGRESS NOTES
Dx: Right NATI, lateral approach           Authorized # of Visits:  8  Fall Risk: standard         Precautions: no active hip abd/extension until 6 weeks post op, DOS  6/30/2022      Subjective:     Current Pain Rating: 3/10 with walking   Objective:   Patient instructed in soft tissue release right hip flexor. Right knee extension is improved after release     Assessment:   Patient tolerated exercise well. She remains somewhat apprehensive with touch at the anterior hip but was able to appreciate reduction in pain with prolonged pressure. Right knee extension improved and there is reduction in anterior hip tightness       Plan: Continue physical therapy 3 more visits         Date: 7/20/2022  Tx#: 2/8 Date: 7/25/2022  Tx#: 3/8 Date: 7/27/2022  Tx#: 4/8 Date:8/1/2022  Tx#: 5/ Date:8/3/2022  Tx#: 6/ Date: 8/8/2022  Tx#: 7/ Date: 8/10/2022  Tx#: 8/8 Date: 8/15/2022  Tx#: 9/12   TherEx TherEx TherEx TherEx TherEx TherEx TherEx TherEx   Nustep L3  8 minutes  Nustep L5 8 minutes  Nustep L5 8 minutes  Nustep L5 8 minutes  Nustep L5 8 minutes Nustep L5 8 minutes Nustep L5 8 minutes  Nustep L5 8 minutes    Right hip PROM within pain free ROM.   Forward step up 4 inch step 10 reps each UE support  Bilateral heel raise 20 reps Bilateral heel raise 20 reps Forward step up 6 inch step 10 reps each Standing hip flexor stretch 10 sec x 5 Lateral step up/up down 4 inch step with UE support 10 reps each  Lateral step up/down 6 inch step 10 reps x 2    TKE right and left knee > 15 reps with 5 second hold  Lateral step up and over 10 reps each UE support Forward step up 6 inch step 10 reps each Forward step up 6 inch step 10 reps each Lateral step up/down 6 inch step 10 reps x 2 with UE support Rhythmic stabilization contract shoulders 10 reps, 10 reps shoulder/pelvis Ascend 6 inch step with UE support > 10 reps each tactile cues for right LE Resisted side,back,forward walk with medicordz in parallel bars > 8 reps each    Left knee to chest with right 3 reps x 20 seconds  TKE > 20 reps each with 5 second hold  Lateral step up/down 6 inch step 10 reps x 2 with UE support Lateral step up/down 6 inch step 10 reps x 2 with UE support Rhythmic stabilization contract shoulders 10 reps, 10 reps shoulder/pelvis Forward step up 6 inch step 10 reps each Rhythmic stabilization/perturbations standing split and parallel stance > 10 each  TRX squats 10 reps x 2    Right heel slide with abdominal bracing via elbow dig  Left hip single knee to chest 4 reps x 30 seconds each Rhythmic stabilization contract shoulders 10 reps, 10 reps shoulder/pelvis  Rhythmic stabilization contract shoulders 10 reps, 10 reps shoulder/pelvis Shuttle L5 bilateral press x20; L3.5 SIngle Leg x 20 Lateral step up/down 6 inch step 10 reps x 2 with UE support Lateral step and return with UE support (HEP) Calf stretch 30 seconds x 3 each    Left bent knee fallouts  Right heel slides > 20 reps  Forward step with bilateral overhead reach 10 reps each  HL ABD red x 20 Shuttle L5 bilateral press x20; L3.5 SIngle Leg x 20 Single knee to chest with left LE, right LE extended  (HEP) Single leg stance with UE support hip abduction 10 reps x 2 each    Sit to stand 10 reps no support Forward step with bilateral same side reach 10 reps each    Standing hip flexor stretch 10 sec x 5  Single leg stance with UE support no pelvic drop (HEP) Seated knee extension with ankle pump 10 reps x 3 each    Right hip quad/hip flexor soft tissue release  TKE > 20 reps with 5 second hold each   Shuttle L5 bilateral press x20; L3.5 SIngle Leg x 20   Bridge > 15 reps - good TKE  2 pounds 10 reps x 2 with 5 second hold      Left LE single knee to chest PROM with right hip/knee in extension  HL ABD red x 20    Instruction in right hip flexor release with prolonged pressure - good relief of tenderness, increased right knee extension      Left bent knee fallouts                        Charges:  TherEx 3        Total Timed Treatment: 45 min  Total Treatment Time: 45  min

## 2022-08-17 ENCOUNTER — OFFICE VISIT (OUTPATIENT)
Dept: PHYSICAL THERAPY | Age: 77
End: 2022-08-17
Attending: ORTHOPAEDIC SURGERY
Payer: MEDICARE

## 2022-08-17 PROCEDURE — 97110 THERAPEUTIC EXERCISES: CPT | Performed by: PHYSICAL THERAPIST

## 2022-08-17 NOTE — PROGRESS NOTES
Dx: Right NATI, lateral approach           Authorized # of Visits:  8  Fall Risk: standard         Precautions: no active hip abd/extension until 6 weeks post op, DOS  2022      Subjective:   Patient states she is mostly going without cane when walking in her home  Current Pain Ratin/10 with walking   Objective:   Closed chain exercise, decreased control with stance right LE, step matrix. SBA to CGA provided during closed chain exercise    Assessment:   No pain during the session. Patient is progressing with closed chain exercises. She is apprehensive regarding her balance    Plan:   Physical therapy 2 more sessions.      Date:2022  Tx#: / Date:8/3/2022  Tx#: / Date: 2022  Tx#: / Date: 8/10/2022  Tx#:  Date: 8/15/2022  Tx#:  Date: 2022  Tx#: 10/12   TherEx TherEx TherEx TherEx TherEx TherEx   Nustep L5 8 minutes  Nustep L5 8 minutes Nustep L5 8 minutes Nustep L5 8 minutes  Nustep L5 8 minutes  Nusep L5 8 minutes   Bilateral heel raise 20 reps Forward step up 6 inch step 10 reps each Standing hip flexor stretch 10 sec x 5 Lateral step up/up down 4 inch step with UE support 10 reps each  Lateral step up/down 6 inch step 10 reps x 2  Forward step 10 reps each 6 inch step, descend step with UE support   Forward step up 6 inch step 10 reps each Lateral step up/down 6 inch step 10 reps x 2 with UE support Rhythmic stabilization contract shoulders 10 reps, 10 reps shoulder/pelvis Ascend 6 inch step with UE support > 10 reps each tactile cues for right LE Resisted side,back,forward walk with medicordz in parallel bars > 8 reps each  Single leg stance with UE support 10 reps x 2 hip abduction    Lateral step up/down 6 inch step 10 reps x 2 with UE support Rhythmic stabilization contract shoulders 10 reps, 10 reps shoulder/pelvis Forward step up 6 inch step 10 reps each Rhythmic stabilization/perturbations standing split and parallel stance > 10 each  TRX squats 10 reps x 2  Medicordz side and forward walk in parallel bars   Rhythmic stabilization contract shoulders 10 reps, 10 reps shoulder/pelvis Shuttle L5 bilateral press x20; L3.5 SIngle Leg x 20 Lateral step up/down 6 inch step 10 reps x 2 with UE support Lateral step and return with UE support (HEP) Calf stretch 30 seconds x 3 each  Split stance transverse plane isometric 10 reps each     HL ABD red x 20 Shuttle L5 bilateral press x20; L3.5 SIngle Leg x 20 Single knee to chest with left LE, right LE extended  (HEP) Single leg stance with UE support hip abduction 10 reps x 2 each TRX squats 10 reps x 2      Standing hip flexor stretch 10 sec x 5  Single leg stance with UE support no pelvic drop (HEP) Seated knee extension with ankle pump 10 reps x 3 each Forward step  With SB lift overhead 10 reps x 2 CGA    Shuttle L5 bilateral press x20; L3.5 SIngle Leg x 20   Bridge > 15 reps - good TKE  2 pounds 10 reps x 2 with 5 second hold  Lateral step with SB lift overhead 10 reps each    HL ABD red x 20    Instruction in right hip flexor release with prolonged pressure - good relief of tenderness, increased right knee extension                         Charges:  TherEx 3        Total Timed Treatment: 45 min  Total Treatment Time: 45  min

## 2022-08-22 ENCOUNTER — OFFICE VISIT (OUTPATIENT)
Dept: PHYSICAL THERAPY | Age: 77
End: 2022-08-22
Attending: ORTHOPAEDIC SURGERY
Payer: MEDICARE

## 2022-08-22 PROCEDURE — 97110 THERAPEUTIC EXERCISES: CPT | Performed by: PHYSICAL THERAPIST

## 2022-08-22 NOTE — PROGRESS NOTES
Dx: Right NATI, lateral approach           Authorized # of Visits:  8  Fall Risk: standard         Precautions: no active hip abd/extension until 6 weeks post op, DOS  2022      Subjective:   Patient states she is no longer using her cane at home. She continues to use rolling walker at night when getting up from bed. Current Pain Ratin/10 with walking   Objective:   Decreased pelvic control with single leg stance right LE/UE support    Assessment:   Patient had no difficulty loading right LE on shuttle and with NWB exercise. Good strength with isometrics.  Postural changes appear to be spinal related     Plan:   Physical therapy one more session     Date:2022  Tx#: / Date:8/3/2022  Tx#: / Date: 2022  Tx#: / Date: 8/10/2022  Tx#:  Date: 8/15/2022  Tx#:  Date: 2022  Tx#: 10/12 Date: 2022  Tx#:    TherEx TherEx TherEx TherEx TherEx TherEx TherEx   Nustep L5 8 minutes  Nustep L5 8 minutes Nustep L5 8 minutes Nustep L5 8 minutes  Nustep L5 8 minutes  Nusep L5 8 minutes Nustep L5 8 minutes    Bilateral heel raise 20 reps Forward step up 6 inch step 10 reps each Standing hip flexor stretch 10 sec x 5 Lateral step up/up down 4 inch step with UE support 10 reps each  Lateral step up/down 6 inch step 10 reps x 2  Forward step 10 reps each 6 inch step, descend step with UE support TRX squats 10 reps x 2   Forward step up 6 inch step 10 reps each Lateral step up/down 6 inch step 10 reps x 2 with UE support Rhythmic stabilization contract shoulders 10 reps, 10 reps shoulder/pelvis Ascend 6 inch step with UE support > 10 reps each tactile cues for right LE Resisted side,back,forward walk with medicordz in parallel bars > 8 reps each  Single leg stance with UE support 10 reps x 2 hip abduction  Single leg balance light one arm UE support > 5 reps x 10 seconds    Lateral step up/down 6 inch step 10 reps x 2 with UE support Rhythmic stabilization contract shoulders 10 reps, 10 reps shoulder/pelvis Forward step up 6 inch step 10 reps each Rhythmic stabilization/perturbations standing split and parallel stance > 10 each  TRX squats 10 reps x 2  Medicordz side and forward walk in parallel bars Hooklying isometric hip abduction 10 reps x 2    Rhythmic stabilization contract shoulders 10 reps, 10 reps shoulder/pelvis Shuttle L5 bilateral press x20; L3.5 SIngle Leg x 20 Lateral step up/down 6 inch step 10 reps x 2 with UE support Lateral step and return with UE support (HEP) Calf stretch 30 seconds x 3 each  Split stance transverse plane isometric 10 reps each  Bent knee fallouts 10 reps x 2     HL ABD red x 20 Shuttle L5 bilateral press x20; L3.5 SIngle Leg x 20 Single knee to chest with left LE, right LE extended  (HEP) Single leg stance with UE support hip abduction 10 reps x 2 each TRX squats 10 reps x 2  Isometrics hookling transverse plane > 15 reps     Standing hip flexor stretch 10 sec x 5  Single leg stance with UE support no pelvic drop (HEP) Seated knee extension with ankle pump 10 reps x 3 each Forward step  With SB lift overhead 10 reps x 2 CGA Shuttle 4 bands squats 10 reps x 2     Shuttle 3 bands single leg squats right = 50  Left = 50    Shuttle L5 bilateral press x20; L3.5 SIngle Leg x 20   Bridge > 15 reps - good TKE  2 pounds 10 reps x 2 with 5 second hold  Lateral step with SB lift overhead 10 reps each  Lateral step up/down with UE support 6 inch step left = 20  Right = 20   HL ABD red x 20    Instruction in right hip flexor release with prolonged pressure - good relief of tenderness, increased right knee extension   Rhythmic stabilization parallel and split stance contact points pelvis/shoulders > 10 reps each                          Charges:  TherEx 3        Total Timed Treatment: 45 min  Total Treatment Time: 45  min

## 2022-08-24 ENCOUNTER — OFFICE VISIT (OUTPATIENT)
Dept: PHYSICAL THERAPY | Age: 77
End: 2022-08-24
Attending: ORTHOPAEDIC SURGERY
Payer: MEDICARE

## 2022-08-24 PROCEDURE — 97110 THERAPEUTIC EXERCISES: CPT | Performed by: PHYSICAL THERAPIST

## (undated) DEVICE — SUT ORTHOCORD 2 OS-6 223103

## (undated) DEVICE — SPONGE RAYTEC 4X4 RF DETECT

## (undated) DEVICE — STERILE POLYISOPRENE POWDER-FREE SURGICAL GLOVES: Brand: PROTEXIS

## (undated) DEVICE — SUTURE VICRYL 2-0 CT-1

## (undated) DEVICE — VIOLET BRAIDED (POLYGLACTIN 910), SYNTHETIC ABSORBABLE SUTURE: Brand: COATED VICRYL

## (undated) DEVICE — PILLOW ABDUCTION HIP SM

## (undated) DEVICE — SLEEVE KENDALL SCD EXPRESS MED

## (undated) DEVICE — Device: Brand: STABLECUT®

## (undated) DEVICE — STERILE SYNTHETIC POLYISOPRENE POWDER-FREE SURGICAL GLOVES WITH HYDROGEL COATING, SMOOTH FINISH, STRAIGHT FINGER: Brand: PROTEXIS

## (undated) DEVICE — SOL  .9 1000ML BAG

## (undated) DEVICE — CERAMIC HEAD UPCHARGE: Type: IMPLANTABLE DEVICE

## (undated) DEVICE — LIGHT HANDLE

## (undated) DEVICE — TOTAL HIP W/POR CUP & COCR HD: Type: IMPLANTABLE DEVICE

## (undated) DEVICE — TOTAL HIP CDS: Brand: MEDLINE INDUSTRIES, INC.

## (undated) DEVICE — SUTURE NABSB OTHCRD 2 OS-6

## (undated) DEVICE — HOOD: Brand: FLYTE

## (undated) DEVICE — PIN STEINMAN SMOOTH 1/8 9

## (undated) DEVICE — 450 ML BOTTLE OF 0.05% CHLORHEXIDINE GLUCONATE IN 99.95% STERILE WATER FOR IRRIGATION, USP AND APPLICATOR.: Brand: IRRISEPT ANTIMICROBIAL WOUND LAVAGE

## (undated) DEVICE — BANDAGE COMP PREMPRO 5YDX4IN

## (undated) DEVICE — CAUTERY TIP BLADE EXTENSION

## (undated) DEVICE — Device

## (undated) DEVICE — CHLORAPREP ORANGE TINT 10.5ML

## (undated) DEVICE — GOWN,SIRUS,FABRIC-REINFORCED,X-LARGE: Brand: MEDLINE

## (undated) DEVICE — KENDALL SCD EXPRESS SLEEVES, KNEE LENGTH, MEDIUM: Brand: KENDALL SCD

## (undated) DEVICE — WRAP COOLING HIP W/ICE PILLOW

## (undated) DEVICE — HOOD, PEEL-AWAY: Brand: FLYTE

## (undated) DEVICE — PIN STEINMAN SMOOTH 1/8 9: Type: IMPLANTABLE DEVICE | Site: HIP

## (undated) DEVICE — SYRINGE 30ML LL TIP

## (undated) DEVICE — NEEDLE SPINAL 18X3-1/2 PINK.

## (undated) DEVICE — 3M™ MICROFOAM™ TAPE 1528-4: Brand: 3M™ MICROFOAM™

## (undated) DEVICE — MLPD DISPOSABLE PAD (6' ROLL) 3 ROLLS: Brand: SCHAERER MEDICAL USA

## (undated) DEVICE — DRAPE,U/SHT,SPLIT,FILM,60X84,STERILE: Brand: MEDLINE

## (undated) DEVICE — DECANTER BAG 9": Brand: MEDLINE INDUSTRIES, INC.

## (undated) DEVICE — SPECIMEN CONTAINER,POSITIVE SEAL INDICATOR, OR PACKAGED: Brand: PRECISION

## (undated) DEVICE — SUT VICRYL 2-0 CT-1 J945H

## (undated) DEVICE — BLADE ELECTRODE: Brand: EDGE

## (undated) DEVICE — APPLICATOR CHLORAPREP 10.5ML

## (undated) NOTE — IP AVS SNAPSHOT
Patient Demographics     Address  [id number] Phone  [phone] [facility]  [phone] [facility] E-mail Address  [email or web address]      Emergency Contact(s)     Name Relation Home Work [address] [place] [address]   [phone] Sex  ? Usually allowed after four to six weeks – check with surgeon at your office visit. Return to work  ? Usually allowed after four to six weeks. Discuss specific work activities with your surgeon. Restrictions  ?  For hip replacement surgery blood in your urine. Use electric razors and soft toothbrushes only. ? Do not take aspirin while taking blood thinners unless ordered by your physician. ? Review anticoagulant education information sheet provided. Discomfort  ?  Surgical discomfort is ? An enema or suppository may be needed if above measures do not work. Prevention of infection and promotion of healing  ? Good hand washing is important.  Everyone should wash their hands or use hand  as soon as they walk in your house[facility] ? Increased or foul smelling drainage from incision  ? Red streaks on skin near incision. ? Temperature >100.4F.  ? Increased pain at incision not relieved by pain medication. Signs of Possible Dislocation  ? Increased severe leg or groin pain  ?  Flavio Clay space to open car doors to position yourself properly with walker to get in and out of your car safely; some parking spaces are  practically on top of each other and do not give you enough room.        SPECIAL  INSTRUCTIONS:      View hip discharge educatio Commonly known as:  LIPITOR      Take 1 tablet (10 mg total) by mouth daily. Ellen Salinas MD         celecoxib 200 MG Caps  Commonly known as:  CeleBREX      Take 1 capsule (200 mg total) by mouth daily.   Stop taking on:  August 30, 2020   Fatou HEREDIA mL 08/07/20 0609 Given              Recent Vital Signs       Most Recent Value   Vitals  127/76 Filed at 08/07/2020 1146   Pulse  85 Filed at 08/07/2020 1146   Resp  20 Filed at 08/07/2020 1146   Temp  98 °F (36.7 °C) Filed at 08/07/2020 1146   SpO2  94 % Filed:  8/6/2020  6:47 AM Date of Service:  8/6/2020  6:47 AM Status:  Signed    :  Ricardo James MD (Physician)       Judah Baltazar   7/22/2020 9:15 AM   Office Visit   MRN:  [id number]   Description: 70year old female Provider: Saúl Paniagua Son Dr. Michael Sanchez at [facility] on 8/6/2020. Pt without symptoms            Current Outpatient Medications   Medication Sig Dispense Refill   • Meloxicam 15 MG Oral Tab Take 1 tablet (15 mg total) by mouth daily.  30 tablet 1   • hydrochlorothiazide 12.5 MG Oral Tab /72   Pulse 79   Temp 97.1 °F (36.2 °C) (Temporal)   Ht 5' 6\" (1.676 m)   Wt 162 lb 6.4 oz (73.7 kg)   LMP  (LMP Unknown)   SpO2 98%   Breastfeeding No   BMI 26.21 kg/m²   GENERAL: well developed, well nourished,in no apparent distress  SKIN: no celia Electronically signed by Caryle Ferrier, MD on 8/6/2020  6:47 AM   Attribution Key    JL. 1 - Caryle Ferrier, MD on 8/6/2020  6:47 AM                        Consults - MD Consult Notes      Consults signed by Kiran Wilcox MD at 8/6/2020  3:20 atorvastatin 10 MG Oral Tab, Take 1 tablet (10 mg total) by mouth daily. (Patient taking differently: Take 10 mg by mouth nightly.  ), Disp: 90 tablet, Rfl: 3  Nutritional Supplements (THERALITH XR) Oral Tab, Take  by mouth.  2 tablets am and 2 tablets pm, Comprehensive ROS reviewed and negative except for what's stated above. Including negative for chest pain, shortness of breath, syncope.        OBJECTIVE:[LM.1]  /62 (BP Location: Left arm)   Pulse 76   Temp 98.1 °F (36.7 °C) (Oral)   Resp 18   Ht 5' Patient and/or patient's family given opportunity to ask questions and note understanding and agreeing with therapeutic plan as outlined      Thank you for allowing me to participate in the care of this patient.      Thank Eloisa Brambila MD  D • REMOVAL OF TONSILS,<13 Y/O         SUBJECTIVE  \"I won't be going up the stairs for a long while, that's where his office is anyway\"     Patient’s self-stated goal is to return home, walk more and without pain     OBJECTIVE  Precautions: Hip abduction p Sit>stand to RW supervision. Pt amb to BR c RW and CGA,. Toilet t/f supervision, pt ind c jose francisco care, SBA at sink for hand hygiene.   Pt gait trained c RW and CGA progressing to supervision, pt cued for reciprocal gait, quad activation and knee ext during st mobility. Research supports that patients with this level of impairment may benefit from home c HHPT.[CY.2]  DISCHARGE RECOMMENDATIONS  PT Discharge Recommendations: Home with home health PT    PLAN  PT Treatment Plan: Bed mobility; Endurance; Energy conserv Past Medical History:   Diagnosis Date   • Calculus of kidney    • High cholesterol    • Leiomyoma of uterus, unspecified    • Migraines     hormonal   • Osteoarthritis     glasses   • Renal stone    • Visual impairment     glasses       Past Surgical Hist ACTIVITY TOLERANCE                         O2 WALK                  AM-PAC '6-Clicks' INPATIENT SHORT FORM - BASIC MOBILITY  How much difficulty does the patient currently have. ..  -   Turning over in bed (including adjusting bedclothes, sheets and blanket to bs chair, educated about POC. Discussed session with Rn and pct, assist recommendations.         Exercise/Education Provided:  Bed mobility  Energy conservation  Functional activity tolerated  Gait training  Posture  Transfer training    Patient End of assistance level: supervison     Goal #3    Patient is able to ambulate 150 feet with assistive device at assistance level: supervision   Goal #4    Patient will negotiate one curb w/ assistive device and supervision   Goal #5    Patient verbalizes and/or OCCUPATIONAL PROFILE    HOME SITUATION  Type of Home: House  Home Layout: One level  Lives With: Spouse    Toilet and Equipment: Comfort height toilet; Other (Comment)(uses sink next to toilet for leverage)  Shower/Tub and Equipment: Walk-in shower; Shower c CMS Modifier (G-Code): CJ    FUNCTIONAL TRANSFER ASSESSMENT  Supine to Sit : Contact guard assist  Sit to Stand: Supervision    Skilled Therapy Provided:   PPE worn by writer: droplet mask, gloves. [MM.1]    OT educated patient on OT role and session goals. applied; Family present; Other (Comment)(blanket roll between knees)    ASSESSMENT     Patient is a 70year old female admitted on 8/6/2020 for elective L NATI. Complete medical history and occupational profile noted above.  Functional outcome measures complet Patient/Caregiver able to demonstrate safety with ADLS: using RW with supervision[MM. 1]     Attribution Hook    MM. 1 - Shadia Craft OT on 8/7/2020 10:23 AM  MM. 2 - Shadia Craft OT on 8/7/2020 12:46 PM  MM. 3 - Shadia Craft OT on 8/7/2020  1

## (undated) NOTE — LETTER
[facility]  Phone: [phone]  OUTSIDE TESTING RESULT REQUEST      TO:   Dr.Shah Guerrero Today's Date: 7/16/20    FAX #: [phone]     IMPORTANT: FOR YOUR IMMEDIATE ATTENTION  Please FAX all test results listed below to:

## (undated) NOTE — LETTER
Patient Name: Salome Sal  YOB: 1945          MRN :  [id number]  Date:  8/10/2022  Referring Physician:  Herminia Leal    Progress Summary  Pt has attended 8 visits in Physical Therapy. Subjective:   Patient states she is using her cane mostly but she is using her walker at night when she gets up to go the bathroom. Current Pain Ratin/10  Objective:   Gait: Patient has progressed to ambulation with cane. Gait is steady with and without cane. She is slight forward flexion during gait and static standing. Right knee lacks terminal extension. With verbal cues for upright standing, there is right anterior hip pulling    Stairs: She is able ascend/descend 6 inch step. There is a natural right toe out position. Decreased hip abductor strength is present with ascend/descending. ROM/flexibility: Bilateral hip flexor and hamstring tightness. Quads have not been assessed. Strength: hooklying hip abduction/hip flexion 5/5. PF 3+/5 bilat. Bridging initiated today with no pain. Palpation: Mild lateral hip pain at the incision. Lateral hip pain with ascending a step is decreased with cues to avoid pelvic shift     Assessment:   Patient apprehension with gait/ambulation is improving. As she moves from hip precautions, she will benefit from additional work on flexibility, gait, stairs/curbs. The anterior hip tightness may be combination of limited right hip extension as well as hip flexor tightness. She also lacks 5-10 degrees of right knee extension. With over pressure into right knee extension, patient experiences more anterior hip stretch versus right knee pain/stretch. Discussed progress with the patient, recommend continuing therapy for the remaining 4 visits that are scheduled. Plan: Continue skilled Physical Therapy 2 x/week or a total of 4 visits over a 90 day period.  Treatment will include: ROM,progressive closed chain strengthening         Patient/Family/Caregiver was advised of these findings, precautions, and treatment options and has agreed to actively participate in planning and for this course of care. Thank you for your referral. If you have any questions, please contact me at Dept: [phone]. Sincerely,  Electronically signed by therapist: Hugh Guzman PT     Physician's certification required:  Yes  Please co-sign or sign and return this letter via fax as soon as possible to [phone]. I certify the need for these services furnished under this plan of treatment and while under my care. X___________________________________________________ Date____________________    Certification From: 0/63/3586  To:11/8/2022 21st Century Cures Act Notice to Patient: Medical documents like this are made available to patients in the interest of transparency. However, be advised this is a medical document and it is intended as jigi-ok-rciw communication between your medical providers. This medical document may contain abbreviations, assessments, medical data, and results or other terms that are unfamiliar. Medical documents are intended to carry relevant information, facts as evident, and the clinical opinion of the practitioner. As such, this medical document may be written in language that appears blunt or direct. You are encouraged to contact your medical provider and/or [facility] if you have any questions about this medical document.

## (undated) NOTE — IP AVS SNAPSHOT
[address]            (For Outpatient Use Only) Initial Admit Date: 8/6/2020   Inpt/Obs Admit Date: Inpt: 8/6/20 / Obs: N/A   Discharge Date: 8/7/2020   Zeus Gould:  [de-identified]   MRN: [de-identified]   CSN: [id number]   CEID: [id number] Payor:  Plan:    Group Number:  Insurance Type:    Subscriber Name:  Subscriber :    Subscriber ID:  Pt Rel to Subscriber:    Hospital Account Financial Class: Medicare    2020

## (undated) NOTE — LETTER
[facility]  Phone: [phone]  OUTSIDE TESTING RESULT REQUEST      TO:   Amandeep Tobias Today's Date: 22    FAX #: [phone]     IMPORTANT: FOR YOUR IMMEDIATE ATTENTION  Please FAX all test results listed below to: [phone]     Testing already done on or about: Appointment with you 2022     * * * * If testing is NOT complete, arrange with patient A.S.A.P. * * * *      Patient Name: Allie Berumen  Surgery Date: 2022    CSN: [id number]  Medical Record: [id number]   : 1945 - A: 78 y      Sex: female  Surgeon(s):  Washington Valdivia MD  Procedure: RIGHT LATERAL TOTAL HIP ARTHROPLASTY  Anesthesia Type: Spinal     Surgeon: Washington Valdivia MD       The following Testing and Time Line are REQUIRED PER ANESTHESIA     CBC [with Differential & Platelets] within  90 days  BMP (requires 4 hour fast) within  90 days  MSSA/MRSA Nasal screening within 30 days      Thank You,   Sent by: KELTON Damon RN      CONFIDENTIALITY NOTICE  This transmission is intended only for the use of the individual or entity to which it is addressed and may contain information that is privileged and confidential.  If the reader of this message is not the intended recipient, you are hereby notified that any disclosure, distribution, or copying of this information is strictly prohibited. If you have received this transmission in error, please notify us immediately by telephone, and return the original documents to us at the address listed above.            Surendra  5/13/15